# Patient Record
Sex: FEMALE | Race: WHITE | NOT HISPANIC OR LATINO
[De-identification: names, ages, dates, MRNs, and addresses within clinical notes are randomized per-mention and may not be internally consistent; named-entity substitution may affect disease eponyms.]

---

## 2017-01-18 ENCOUNTER — RX ONLY (OUTPATIENT)
Age: 57
Setting detail: RX ONLY
End: 2017-01-18

## 2017-01-18 ENCOUNTER — APPOINTMENT (RX ONLY)
Dept: URBAN - METROPOLITAN AREA CLINIC 44 | Facility: CLINIC | Age: 57
Setting detail: DERMATOLOGY
End: 2017-01-18

## 2017-01-18 ENCOUNTER — APPOINTMENT (RX ONLY)
Dept: URBAN - METROPOLITAN AREA CLINIC 45 | Facility: CLINIC | Age: 57
Setting detail: DERMATOLOGY
End: 2017-01-18

## 2017-01-18 DIAGNOSIS — Z41.9 ENCOUNTER FOR PROCEDURE FOR PURPOSES OTHER THAN REMEDYING HEALTH STATE, UNSPECIFIED: ICD-10-CM

## 2017-01-18 DIAGNOSIS — L73.8 OTHER SPECIFIED FOLLICULAR DISORDERS: ICD-10-CM

## 2017-01-18 DIAGNOSIS — L71.8 OTHER ROSACEA: ICD-10-CM

## 2017-01-18 PROCEDURE — ? TREATMENT REGIMEN

## 2017-01-18 PROCEDURE — 99213 OFFICE O/P EST LOW 20 MIN: CPT

## 2017-01-18 PROCEDURE — ? DYSPORT

## 2017-01-18 PROCEDURE — ? PRESCRIPTION

## 2017-01-18 PROCEDURE — ? COUNSELING

## 2017-01-18 RX ORDER — AZELAIC ACID 0.15 G/G
AEROSOL, FOAM TOPICAL
Qty: 1 | Refills: 3 | Status: ERX

## 2017-01-18 ASSESSMENT — LOCATION SIMPLE DESCRIPTION DERM
LOCATION SIMPLE: LEFT CHEEK
LOCATION SIMPLE: GLABELLA
LOCATION SIMPLE: GLABELLA
LOCATION SIMPLE: LEFT CHEEK
LOCATION SIMPLE: RIGHT CHEEK
LOCATION SIMPLE: RIGHT CHEEK

## 2017-01-18 ASSESSMENT — LOCATION DETAILED DESCRIPTION DERM
LOCATION DETAILED: LEFT INFERIOR MEDIAL MALAR CHEEK
LOCATION DETAILED: GLABELLA
LOCATION DETAILED: RIGHT INFERIOR MEDIAL MALAR CHEEK
LOCATION DETAILED: LEFT INFERIOR MEDIAL MALAR CHEEK
LOCATION DETAILED: GLABELLA
LOCATION DETAILED: RIGHT INFERIOR MEDIAL MALAR CHEEK

## 2017-01-18 ASSESSMENT — LOCATION ZONE DERM
LOCATION ZONE: FACE
LOCATION ZONE: FACE

## 2017-01-18 NOTE — PROCEDURE: TREATMENT REGIMEN
Initiate Treatment: Patient was advised to begin using the Finacea once daily in the morning and Tretinoin Cream once daily at night.  Patient was advised to follow up in 2-3 months for evaluation of treatment.
Detail Level: Zone

## 2017-01-18 NOTE — HPI: RASH (ROSACEA)
How Severe Is Your Rosacea?: moderate
Is This A New Presentation, Or A Follow-Up?: Follow Up Rosacea

## 2017-01-18 NOTE — PROCEDURE: MIPS QUALITY
Quality 110: Preventive Care And Screening: Influenza Immunization: Influenza immunization was not ordered or administered, reason not given
Quality 111:Pneumonia Vaccination Status For Older Adults: Pneumococcal Vaccination not Administered or Previously Received, Reason not Otherwise Specified
Quality 226: Preventive Care And Screening: Tobacco Use: Screening And Cessation Intervention: Patient screened for tobacco and never smoked
Detail Level: Detailed

## 2017-01-18 NOTE — PROCEDURE: MIPS QUALITY
Quality 226: Preventive Care And Screening: Tobacco Use: Screening And Cessation Intervention: Patient screened for tobacco and never smoked
Detail Level: Detailed
Quality 110: Preventive Care And Screening: Influenza Immunization: Influenza immunization was not ordered or administered, reason not given
Quality 111:Pneumonia Vaccination Status For Older Adults: Pneumococcal Vaccination not Administered or Previously Received, Reason not Otherwise Specified

## 2017-01-18 NOTE — PROCEDURE: DYSPORT
Lateral Platysmal Bands Units: 0
Post-Care Instructions: Patient instructed to not lie down for 4 hours and limit physical activity for 24 hours. Patient instructed not to travel by airplane for 48 hours.
Consent: Written consent obtained. Risks include but not limited to lid/brow ptosis, bruising, swelling, diplopia, temporary effect, incomplete chemical denervation.
Glabellar Complex Units: 60
Dilution (U/0.1 Cc): 30
Expiration Date (Month Year): 06/30/2017
Detail Level: Detailed
Lot #: [id number]
Price (Use Numbers Only, No Special Characters Or $): 210

## 2017-01-18 NOTE — PROCEDURE: TREATMENT REGIMEN
Detail Level: Zone
Initiate Treatment: Patient was advised to begin using the Finacea once daily in the morning and Tretinoin Cream once daily at night.  Patient was advised to follow up in 2-3 months for evaluation of treatment.

## 2017-01-18 NOTE — PROCEDURE: DYSPORT
Consent: Written consent obtained. Risks include but not limited to lid/brow ptosis, bruising, swelling, diplopia, temporary effect, incomplete chemical denervation.
Glabellar Complex Units: 60
Masseter Units: 0
Expiration Date (Month Year): 06/30/2017
Post-Care Instructions: Patient instructed to not lie down for 4 hours and limit physical activity for 24 hours. Patient instructed not to travel by airplane for 48 hours.
Detail Level: Detailed
Lot #: [id number]
Dilution (U/0.1 Cc): 30
Price (Use Numbers Only, No Special Characters Or $): 210

## 2017-04-21 ENCOUNTER — APPOINTMENT (RX ONLY)
Dept: URBAN - METROPOLITAN AREA CLINIC 37 | Facility: CLINIC | Age: 57
Setting detail: DERMATOLOGY
End: 2017-04-21

## 2017-04-21 ENCOUNTER — RX ONLY (OUTPATIENT)
Age: 57
Setting detail: RX ONLY
End: 2017-04-21

## 2017-04-21 ENCOUNTER — APPOINTMENT (RX ONLY)
Dept: URBAN - METROPOLITAN AREA CLINIC 38 | Facility: CLINIC | Age: 57
Setting detail: DERMATOLOGY
End: 2017-04-21

## 2017-04-21 DIAGNOSIS — L73.8 OTHER SPECIFIED FOLLICULAR DISORDERS: ICD-10-CM

## 2017-04-21 DIAGNOSIS — L70.5 ACNÉ EXCORIÉE: ICD-10-CM

## 2017-04-21 DIAGNOSIS — L72.0 EPIDERMAL CYST: ICD-10-CM

## 2017-04-21 DIAGNOSIS — L71.8 OTHER ROSACEA: ICD-10-CM

## 2017-04-21 PROCEDURE — ? PRESCRIPTION

## 2017-04-21 PROCEDURE — 99213 OFFICE O/P EST LOW 20 MIN: CPT

## 2017-04-21 PROCEDURE — ? COUNSELING

## 2017-04-21 RX ORDER — TRETINOIN 0.1 MG/G
GEL TOPICAL
Qty: 1 | Refills: 3 | Status: ERX

## 2017-04-21 ASSESSMENT — LOCATION DETAILED DESCRIPTION DERM
LOCATION DETAILED: RIGHT SUPERIOR LATERAL MALAR CHEEK
LOCATION DETAILED: RIGHT MEDIAL MALAR CHEEK
LOCATION DETAILED: LEFT INFERIOR CENTRAL MALAR CHEEK
LOCATION DETAILED: RIGHT SUPERIOR LATERAL MALAR CHEEK
LOCATION DETAILED: RIGHT SUPERIOR CENTRAL MALAR CHEEK
LOCATION DETAILED: LEFT INFERIOR CENTRAL MALAR CHEEK
LOCATION DETAILED: RIGHT INFERIOR CENTRAL MALAR CHEEK
LOCATION DETAILED: RIGHT MEDIAL MALAR CHEEK
LOCATION DETAILED: RIGHT CENTRAL ZYGOMA
LOCATION DETAILED: RIGHT SUPERIOR CENTRAL MALAR CHEEK
LOCATION DETAILED: RIGHT INFERIOR CENTRAL MALAR CHEEK
LOCATION DETAILED: RIGHT CENTRAL ZYGOMA

## 2017-04-21 ASSESSMENT — LOCATION SIMPLE DESCRIPTION DERM
LOCATION SIMPLE: RIGHT CHEEK
LOCATION SIMPLE: RIGHT CHEEK
LOCATION SIMPLE: LEFT CHEEK
LOCATION SIMPLE: LEFT CHEEK
LOCATION SIMPLE: RIGHT ZYGOMA
LOCATION SIMPLE: RIGHT ZYGOMA

## 2017-04-21 ASSESSMENT — LOCATION ZONE DERM
LOCATION ZONE: FACE
LOCATION ZONE: FACE

## 2017-04-21 NOTE — PROCEDURE: MIPS QUALITY
Quality 226: Preventive Care And Screening: Tobacco Use: Screening And Cessation Intervention: Patient screened for tobacco and never smoked
Quality 431: Preventive Care And Screening: Unhealthy Alcohol Use - Screening: Patient screened for unhealthy alcohol use using a single question and scores less than 2 times per year
Quality 110: Preventive Care And Screening: Influenza Immunization: Influenza immunization was not ordered or administered, reason not given
Quality 130: Documentation Of Current Medications In The Medical Record: Current Medications Documented
Detail Level: Detailed
Quality 111:Pneumonia Vaccination Status For Older Adults: Pneumococcal Vaccination not Administered or Previously Received, Reason not Otherwise Specified

## 2017-04-21 NOTE — PROCEDURE: MIPS QUALITY
Quality 431: Preventive Care And Screening: Unhealthy Alcohol Use - Screening: Patient screened for unhealthy alcohol use using a single question and scores less than 2 times per year
Quality 111:Pneumonia Vaccination Status For Older Adults: Pneumococcal Vaccination not Administered or Previously Received, Reason not Otherwise Specified
Quality 226: Preventive Care And Screening: Tobacco Use: Screening And Cessation Intervention: Patient screened for tobacco and never smoked
Quality 130: Documentation Of Current Medications In The Medical Record: Current Medications Documented
Detail Level: Detailed
Quality 110: Preventive Care And Screening: Influenza Immunization: Influenza immunization was not ordered or administered, reason not given

## 2017-04-26 RX ORDER — TRETINOIN 0.1 MG/G
GEL TOPICAL
Qty: 1 | Refills: 3 | Status: ERX

## 2017-08-23 ENCOUNTER — APPOINTMENT (RX ONLY)
Dept: URBAN - METROPOLITAN AREA CLINIC 45 | Facility: CLINIC | Age: 57
Setting detail: DERMATOLOGY
End: 2017-08-23

## 2017-08-23 ENCOUNTER — APPOINTMENT (RX ONLY)
Dept: URBAN - METROPOLITAN AREA CLINIC 44 | Facility: CLINIC | Age: 57
Setting detail: DERMATOLOGY
End: 2017-08-23

## 2017-08-23 DIAGNOSIS — Z41.9 ENCOUNTER FOR PROCEDURE FOR PURPOSES OTHER THAN REMEDYING HEALTH STATE, UNSPECIFIED: ICD-10-CM

## 2017-08-23 PROCEDURE — ? DYSPORT

## 2017-08-23 ASSESSMENT — LOCATION ZONE DERM
LOCATION ZONE: FACE
LOCATION ZONE: FACE

## 2017-08-23 ASSESSMENT — LOCATION SIMPLE DESCRIPTION DERM
LOCATION SIMPLE: GLABELLA
LOCATION SIMPLE: GLABELLA

## 2017-08-23 ASSESSMENT — LOCATION DETAILED DESCRIPTION DERM
LOCATION DETAILED: GLABELLA
LOCATION DETAILED: GLABELLA

## 2017-08-23 NOTE — PROCEDURE: DYSPORT
Additional Area 6 Units: 0
Glabellar Complex Units: 60
Detail Level: Detailed
Expiration Date (Month Year): 11/30/2017
Dilution (U/0.1 Cc): 30
Lot #: [id number]
Price (Use Numbers Only, No Special Characters Or $): 210
Post-Care Instructions: Patient instructed to not lie down for 4 hours and limit physical activity for 24 hours. Patient instructed not to travel by airplane for 48 hours.
Consent: Written consent obtained. Risks include but not limited to lid/brow ptosis, bruising, swelling, diplopia, temporary effect, incomplete chemical denervation.

## 2017-08-23 NOTE — PROCEDURE: DYSPORT
Price (Use Numbers Only, No Special Characters Or $): 210
Nasal Root Units: 0
Dilution (U/0.1 Cc): 30
Expiration Date (Month Year): 11/30/2017
Consent: Written consent obtained. Risks include but not limited to lid/brow ptosis, bruising, swelling, diplopia, temporary effect, incomplete chemical denervation.
Glabellar Complex Units: 60
Post-Care Instructions: Patient instructed to not lie down for 4 hours and limit physical activity for 24 hours. Patient instructed not to travel by airplane for 48 hours.
Lot #: [id number]
Detail Level: Detailed

## 2017-12-13 ENCOUNTER — APPOINTMENT (RX ONLY)
Dept: URBAN - METROPOLITAN AREA CLINIC 44 | Facility: CLINIC | Age: 57
Setting detail: DERMATOLOGY
End: 2017-12-13

## 2017-12-13 ENCOUNTER — APPOINTMENT (RX ONLY)
Dept: URBAN - METROPOLITAN AREA CLINIC 45 | Facility: CLINIC | Age: 57
Setting detail: DERMATOLOGY
End: 2017-12-13

## 2017-12-13 DIAGNOSIS — Z41.9 ENCOUNTER FOR PROCEDURE FOR PURPOSES OTHER THAN REMEDYING HEALTH STATE, UNSPECIFIED: ICD-10-CM

## 2017-12-13 PROCEDURE — ? DYSPORT

## 2017-12-13 NOTE — PROCEDURE: DYSPORT
Additional Area 4 Units: 0
Forehead Units: 60
Lot #: [id number]
Price (Use Numbers Only, No Special Characters Or $): 210
Consent: Written consent obtained. Risks include but not limited to lid/brow ptosis, bruising, swelling, diplopia, temporary effect, incomplete chemical denervation.
Detail Level: Detailed
Dilution (U/0.1 Cc): 30
Post-Care Instructions: Patient instructed to use the muscles injected for the next hour. Do not massage the area or lie down for 4 hours and no vigorous physical activity for 24 hours.
Expiration Date (Month Year): 04/2018

## 2017-12-13 NOTE — PROCEDURE: DYSPORT
Additional Area 6 Units: 0
Forehead Units: 60
Dilution (U/0.1 Cc): 30
Post-Care Instructions: Patient instructed to use the muscles injected for the next hour. Do not massage the area or lie down for 4 hours and no vigorous physical activity for 24 hours.
Lot #: [id number]
Expiration Date (Month Year): 04/2018
Consent: Written consent obtained. Risks include but not limited to lid/brow ptosis, bruising, swelling, diplopia, temporary effect, incomplete chemical denervation.
Detail Level: Detailed
Price (Use Numbers Only, No Special Characters Or $): 210

## 2018-03-07 ENCOUNTER — APPOINTMENT (RX ONLY)
Dept: URBAN - METROPOLITAN AREA CLINIC 44 | Facility: CLINIC | Age: 58
Setting detail: DERMATOLOGY
End: 2018-03-07

## 2018-03-07 ENCOUNTER — APPOINTMENT (RX ONLY)
Dept: URBAN - METROPOLITAN AREA CLINIC 45 | Facility: CLINIC | Age: 58
Setting detail: DERMATOLOGY
End: 2018-03-07

## 2018-03-07 DIAGNOSIS — Z41.9 ENCOUNTER FOR PROCEDURE FOR PURPOSES OTHER THAN REMEDYING HEALTH STATE, UNSPECIFIED: ICD-10-CM

## 2018-03-07 PROBLEM — L85.3 XEROSIS CUTIS: Status: ACTIVE | Noted: 2018-03-07

## 2018-03-07 PROCEDURE — ? DYSPORT

## 2018-03-07 NOTE — PROCEDURE: DYSPORT
Additional Area 1 Units: 0
Lot #: [id number]
Price (Use Numbers Only, No Special Characters Or $): 252.00
Consent: Written consent obtained. Risks include but not limited to lid/brow ptosis, bruising, swelling, diplopia, temporary effect, incomplete chemical denervation.
Dilution (U/0.1 Cc): 30
Additional Comments: total of 72 units used
Expiration Date (Month Year): 6/30/18
Detail Level: Detailed
Post-Care Instructions: Patient instructed to use the muscles injected for the next hour. Do not massage the area or lie down for 4 hours and no vigorous physical activity for 24 hours.

## 2018-03-07 NOTE — PROCEDURE: DYSPORT
Additional Area 3 Units: 0
Dilution (U/0.1 Cc): 30
Price (Use Numbers Only, No Special Characters Or $): 252.00
Additional Comments: total of 72 units used
Lot #: [id number]
Post-Care Instructions: Patient instructed to use the muscles injected for the next hour. Do not massage the area or lie down for 4 hours and no vigorous physical activity for 24 hours.
Consent: Written consent obtained. Risks include but not limited to lid/brow ptosis, bruising, swelling, diplopia, temporary effect, incomplete chemical denervation.
Detail Level: Detailed
Expiration Date (Month Year): 6/30/18

## 2018-07-03 ENCOUNTER — APPOINTMENT (RX ONLY)
Dept: URBAN - METROPOLITAN AREA CLINIC 44 | Facility: CLINIC | Age: 58
Setting detail: DERMATOLOGY
End: 2018-07-03

## 2018-07-03 ENCOUNTER — APPOINTMENT (RX ONLY)
Dept: URBAN - METROPOLITAN AREA CLINIC 45 | Facility: CLINIC | Age: 58
Setting detail: DERMATOLOGY
End: 2018-07-03

## 2018-07-03 DIAGNOSIS — Z41.9 ENCOUNTER FOR PROCEDURE FOR PURPOSES OTHER THAN REMEDYING HEALTH STATE, UNSPECIFIED: ICD-10-CM

## 2018-07-03 PROCEDURE — ? DYSPORT

## 2018-07-03 ASSESSMENT — LOCATION ZONE DERM
LOCATION ZONE: FACE
LOCATION ZONE: FACE

## 2018-07-03 ASSESSMENT — LOCATION SIMPLE DESCRIPTION DERM
LOCATION SIMPLE: RIGHT FOREHEAD
LOCATION SIMPLE: SUPERIOR FOREHEAD
LOCATION SIMPLE: RIGHT FOREHEAD
LOCATION SIMPLE: GLABELLA
LOCATION SIMPLE: GLABELLA
LOCATION SIMPLE: SUPERIOR FOREHEAD
LOCATION SIMPLE: LEFT FOREHEAD
LOCATION SIMPLE: LEFT FOREHEAD

## 2018-07-03 ASSESSMENT — LOCATION DETAILED DESCRIPTION DERM
LOCATION DETAILED: RIGHT SUPERIOR FOREHEAD
LOCATION DETAILED: SUPERIOR MID FOREHEAD
LOCATION DETAILED: LEFT INFERIOR MEDIAL FOREHEAD
LOCATION DETAILED: LEFT SUPERIOR FOREHEAD
LOCATION DETAILED: RIGHT INFERIOR MEDIAL FOREHEAD
LOCATION DETAILED: LEFT SUPERIOR FOREHEAD
LOCATION DETAILED: SUPERIOR MID FOREHEAD
LOCATION DETAILED: LEFT INFERIOR MEDIAL FOREHEAD
LOCATION DETAILED: RIGHT INFERIOR MEDIAL FOREHEAD
LOCATION DETAILED: GLABELLA
LOCATION DETAILED: RIGHT SUPERIOR FOREHEAD
LOCATION DETAILED: GLABELLA

## 2018-07-03 NOTE — PROCEDURE: DYSPORT
Price (Use Numbers Only, No Special Characters Or $): 210
Additional Area 5 Units: 0
Additional Area 1 Location: Forehead and Gabellar
Expiration Date (Month Year): 11/30/2018
Detail Level: Detailed
Additional Area 1 Units: 60
Consent: Written consent obtained. Risks include but not limited to lid/brow ptosis, bruising, swelling, diplopia, temporary effect, incomplete chemical denervation.
Lot #: [id number]
Post-Care Instructions: Patient instructed to use the muscles injected for the next hour. Do not massage the area or lie down for 4 hours and no vigorous physical activity for 24 hours.
Dilution (U/0.1 Cc): 30

## 2018-07-03 NOTE — PROCEDURE: DYSPORT
Glabellar Complex Units: 0
Post-Care Instructions: Patient instructed to use the muscles injected for the next hour. Do not massage the area or lie down for 4 hours and no vigorous physical activity for 24 hours.
Price (Use Numbers Only, No Special Characters Or $): 210
Additional Area 1 Location: Forehead and Gabellar
Expiration Date (Month Year): 11/30/2018
Additional Area 1 Units: 60
Dilution (U/0.1 Cc): 30
Consent: Written consent obtained. Risks include but not limited to lid/brow ptosis, bruising, swelling, diplopia, temporary effect, incomplete chemical denervation.
Lot #: [id number]
Detail Level: Detailed

## 2018-10-23 ENCOUNTER — APPOINTMENT (RX ONLY)
Dept: URBAN - METROPOLITAN AREA CLINIC 44 | Facility: CLINIC | Age: 58
Setting detail: DERMATOLOGY
End: 2018-10-23

## 2018-10-23 ENCOUNTER — APPOINTMENT (RX ONLY)
Dept: URBAN - METROPOLITAN AREA CLINIC 45 | Facility: CLINIC | Age: 58
Setting detail: DERMATOLOGY
End: 2018-10-23

## 2018-10-23 DIAGNOSIS — Z41.9 ENCOUNTER FOR PROCEDURE FOR PURPOSES OTHER THAN REMEDYING HEALTH STATE, UNSPECIFIED: ICD-10-CM

## 2018-10-23 PROCEDURE — ? DYSPORT (U OR CC)

## 2018-10-23 NOTE — PROCEDURE: DYSPORT (U OR CC)
Lot #: [id number]
Forehead Units: 30
Additional Area 5 Units: 0
Detail Level: Detailed
Glabellar Complex Units: 45
Expiration Date (Month Year): 04/30/2019
Periorbital Skin Units: 36
Consent: Written consent obtained. Risks include but not limited to lid/brow ptosis, bruising, swelling, diplopia, temporary effect, incomplete chemical denervation.
Price (Use Numbers Only, No Special Characters Or $): 464.15
Post-Care Instructions: Patient instructed to use the muscles injected for the next hour.  Do not massage the area or lie down for 4 hours and no vigorous physical activity for 24 hours.

## 2018-10-23 NOTE — PROCEDURE: DYSPORT (U OR CC)
Detail Level: Detailed
Additional Area 2 Units: 0
Dilution (U/0.1 Cc): 30
Glabellar Complex Units: 45
Periorbital Skin Units: 36
Consent: Written consent obtained. Risks include but not limited to lid/brow ptosis, bruising, swelling, diplopia, temporary effect, incomplete chemical denervation.
Price (Use Numbers Only, No Special Characters Or $): 594.32
Post-Care Instructions: Patient instructed to use the muscles injected for the next hour.  Do not massage the area or lie down for 4 hours and no vigorous physical activity for 24 hours.
Expiration Date (Month Year): 04/30/2019
Lot #: [id number]

## 2019-02-26 ENCOUNTER — APPOINTMENT (RX ONLY)
Dept: URBAN - METROPOLITAN AREA CLINIC 44 | Facility: CLINIC | Age: 59
Setting detail: DERMATOLOGY
End: 2019-02-26

## 2019-02-26 ENCOUNTER — APPOINTMENT (RX ONLY)
Dept: URBAN - METROPOLITAN AREA CLINIC 45 | Facility: CLINIC | Age: 59
Setting detail: DERMATOLOGY
End: 2019-02-26

## 2019-02-26 DIAGNOSIS — L70.0 ACNE VULGARIS: ICD-10-CM

## 2019-02-26 DIAGNOSIS — Z41.9 ENCOUNTER FOR PROCEDURE FOR PURPOSES OTHER THAN REMEDYING HEALTH STATE, UNSPECIFIED: ICD-10-CM

## 2019-02-26 PROCEDURE — ? DYSPORT (U OR CC)

## 2019-02-26 PROCEDURE — ? MEDICATION COUNSELING

## 2019-02-26 PROCEDURE — ? COUNSELING

## 2019-02-26 PROCEDURE — 99212 OFFICE O/P EST SF 10 MIN: CPT

## 2019-02-26 PROCEDURE — ? PRESCRIPTION

## 2019-02-26 PROCEDURE — ? TREATMENT REGIMEN

## 2019-02-26 RX ORDER — DAPSONE 50 MG/G
PEA SIZE GEL TOPICAL ONCE A DAY
Qty: 1 | Refills: 6 | Status: ERX

## 2019-02-26 ASSESSMENT — LOCATION DETAILED DESCRIPTION DERM
LOCATION DETAILED: GLABELLA
LOCATION DETAILED: RIGHT INFERIOR CENTRAL MALAR CHEEK
LOCATION DETAILED: RIGHT MID TEMPLE
LOCATION DETAILED: LEFT LATERAL CANTHUS
LOCATION DETAILED: RIGHT MEDIAL EYEBROW
LOCATION DETAILED: RIGHT INFERIOR CENTRAL MALAR CHEEK
LOCATION DETAILED: RIGHT MEDIAL EYEBROW
LOCATION DETAILED: RIGHT MID TEMPLE
LOCATION DETAILED: GLABELLA
LOCATION DETAILED: LEFT LATERAL CANTHUS

## 2019-02-26 ASSESSMENT — LOCATION SIMPLE DESCRIPTION DERM
LOCATION SIMPLE: RIGHT EYEBROW
LOCATION SIMPLE: LEFT EYELID
LOCATION SIMPLE: RIGHT EYEBROW
LOCATION SIMPLE: RIGHT TEMPLE
LOCATION SIMPLE: RIGHT TEMPLE
LOCATION SIMPLE: GLABELLA
LOCATION SIMPLE: GLABELLA
LOCATION SIMPLE: RIGHT CHEEK
LOCATION SIMPLE: RIGHT CHEEK
LOCATION SIMPLE: LEFT EYELID

## 2019-02-26 ASSESSMENT — LOCATION ZONE DERM
LOCATION ZONE: FACE
LOCATION ZONE: EYELID
LOCATION ZONE: FACE
LOCATION ZONE: EYELID

## 2019-02-26 NOTE — PROCEDURE: DYSPORT (U OR CC)
Quantity Per Injection Site (Units Or Cc): 15 U
Inferior Lateral Orbicularis Oculi Units: 0
Consent: Written consent obtained. Risks include but not limited to lid/brow ptosis, bruising, swelling, diplopia, temporary effect, incomplete chemical denervation.
Price (Use Numbers Only, No Special Characters Or $): 262.72
Periorbital Skin Units: 30
Detail Level: Detailed
Glabellar Complex Units: 45
Post-Care Instructions: Patient instructed to use the muscles injected for the next hour.  Do not massage the area or lie down for 4 hours and no vigorous physical activity for 24 hours.

## 2019-02-26 NOTE — PROCEDURE: COUNSELING
Topical Sulfur Applications Pregnancy And Lactation Text: This medication is Pregnancy Category C and has an unknown safety profile during pregnancy. It is unknown if this topical medication is excreted in breast milk.
Topical Retinoid counseling:  Patient advised to apply a pea-sized amount only at bedtime and wait 30 minutes after washing their face before applying.  If too drying, patient may add a non-comedogenic moisturizer. The patient verbalized understanding of the proper use and possible adverse effects of retinoids.  All of the patient's questions and concerns were addressed.
Spironolactone Counseling: Patient advised regarding risks of diarrhea, abdominal pain, hyperkalemia, birth defects (for female patients), liver toxicity and renal toxicity. The patient may need blood work to monitor liver and kidney function and potassium levels while on therapy. The patient verbalized understanding of the proper use and possible adverse effects of spironolactone.  All of the patient's questions and concerns were addressed.
Azithromycin Pregnancy And Lactation Text: This medication is considered safe during pregnancy and is also secreted in breast milk.
High Dose Vitamin A Counseling: Side effects reviewed, pt to contact office should one occur.
Include Pregnancy/Lactation Warning?: No
Topical Retinoid Pregnancy And Lactation Text: This medication is Pregnancy Category C. It is unknown if this medication is excreted in breast milk.
Isotretinoin Counseling: Patient should get monthly blood tests, not donate blood, not drive at night if vision affected, not share medication, and not undergo elective surgery for 6 months after tx completed. Side effects reviewed, pt to contact office should one occur.
Birth Control Pills Pregnancy And Lactation Text: This medication should be avoided if pregnant and for the first 30 days post-partum.
High Dose Vitamin A Pregnancy And Lactation Text: High dose vitamin A therapy is contraindicated during pregnancy and breast feeding.
Doxycycline Pregnancy And Lactation Text: This medication is Pregnancy Category D and not consider safe during pregnancy. It is also excreted in breast milk but is considered safe for shorter treatment courses.
Topical Clindamycin Counseling: Patient counseled that this medication may cause skin irritation or allergic reactions.  In the event of skin irritation, the patient was advised to reduce the amount of the drug applied or use it less frequently.   The patient verbalized understanding of the proper use and possible adverse effects of clindamycin.  All of the patient's questions and concerns were addressed.
Erythromycin Pregnancy And Lactation Text: This medication is Pregnancy Category B and is considered safe during pregnancy. It is also excreted in breast milk.
Tetracycline Counseling: Patient counseled regarding possible photosensitivity and increased risk for sunburn.  Patient instructed to avoid sunlight, if possible.  When exposed to sunlight, patients should wear protective clothing, sunglasses, and sunscreen.  The patient was instructed to call the office immediately if the following severe adverse effects occur:  hearing changes, easy bruising/bleeding, severe headache, or vision changes.  The patient verbalized understanding of the proper use and possible adverse effects of tetracycline.  All of the patient's questions and concerns were addressed. Patient understands to avoid pregnancy while on therapy due to potential birth defects.
Tazorac Pregnancy And Lactation Text: This medication is not safe during pregnancy. It is unknown if this medication is excreted in breast milk.
Spironolactone Pregnancy And Lactation Text: This medication can cause feminization of the male fetus and should be avoided during pregnancy. The active metabolite is also found in breast milk.
Bactrim Pregnancy And Lactation Text: This medication is Pregnancy Category D and is known to cause fetal risk.  It is also excreted in breast milk.
Isotretinoin Pregnancy And Lactation Text: This medication is Pregnancy Category X and is considered extremely dangerous during pregnancy. It is unknown if it is excreted in breast milk.
Tetracycline Pregnancy And Lactation Text: This medication is Pregnancy Category D and not consider safe during pregnancy. It is also excreted in breast milk.
Erythromycin Counseling:  I discussed with the patient the risks of erythromycin including but not limited to GI upset, allergic reaction, drug rash, diarrhea, increase in liver enzymes, and yeast infections.
Benzoyl Peroxide Pregnancy And Lactation Text: This medication is Pregnancy Category C. It is unknown if benzoyl peroxide is excreted in breast milk.
Dapsone Counseling: I discussed with the patient the risks of dapsone including but not limited to hemolytic anemia, agranulocytosis, rashes, methemoglobinemia, kidney failure, peripheral neuropathy, headaches, GI upset, and liver toxicity.  Patients who start dapsone require monitoring including baseline LFTs and weekly CBCs for the first month, then every month thereafter.  The patient verbalized understanding of the proper use and possible adverse effects of dapsone.  All of the patient's questions and concerns were addressed.
Benzoyl Peroxide Counseling: Patient counseled that medicine may cause skin irritation and bleach clothing.  In the event of skin irritation, the patient was advised to reduce the amount of the drug applied or use it less frequently.   The patient verbalized understanding of the proper use and possible adverse effects of benzoyl peroxide.  All of the patient's questions and concerns were addressed.
Doxycycline Counseling:  Patient counseled regarding possible photosensitivity and increased risk for sunburn.  Patient instructed to avoid sunlight, if possible.  When exposed to sunlight, patients should wear protective clothing, sunglasses, and sunscreen.  The patient was instructed to call the office immediately if the following severe adverse effects occur:  hearing changes, easy bruising/bleeding, severe headache, or vision changes.  The patient verbalized understanding of the proper use and possible adverse effects of doxycycline.  All of the patient's questions and concerns were addressed.
Minocycline Counseling: Patient advised regarding possible photosensitivity and discoloration of the teeth, skin, lips, tongue and gums.  Patient instructed to avoid sunlight, if possible.  When exposed to sunlight, patients should wear protective clothing, sunglasses, and sunscreen.  The patient was instructed to call the office immediately if the following severe adverse effects occur:  hearing changes, easy bruising/bleeding, severe headache, or vision changes.  The patient verbalized understanding of the proper use and possible adverse effects of minocycline.  All of the patient's questions and concerns were addressed.
Azithromycin Counseling:  I discussed with the patient the risks of azithromycin including but not limited to GI upset, allergic reaction, drug rash, diarrhea, and yeast infections.
Topical Clindamycin Pregnancy And Lactation Text: This medication is Pregnancy Category B and is considered safe during pregnancy. It is unknown if it is excreted in breast milk.
Birth Control Pills Counseling: Birth Control Pill Counseling: I discussed with the patient the potential side effects of OCPs including but not limited to increased risk of stroke, heart attack, thrombophlebitis, deep venous thrombosis, hepatic adenomas, breast changes, GI upset, headaches, and depression.  The patient verbalized understanding of the proper use and possible adverse effects of OCPs. All of the patient's questions and concerns were addressed.
Detail Level: Zone
Tazorac Counseling:  Patient advised that medication is irritating and drying.  Patient may need to apply sparingly and wash off after an hour before eventually leaving it on overnight.  The patient verbalized understanding of the proper use and possible adverse effects of tazorac.  All of the patient's questions and concerns were addressed.
Bactrim Counseling:  I discussed with the patient the risks of sulfa antibiotics including but not limited to GI upset, allergic reaction, drug rash, diarrhea, dizziness, photosensitivity, and yeast infections.  Rarely, more serious reactions can occur including but not limited to aplastic anemia, agranulocytosis, methemoglobinemia, blood dyscrasias, liver or kidney failure, lung infiltrates or desquamative/blistering drug rashes.
Topical Sulfur Applications Counseling: Topical Sulfur Counseling: Patient counseled that this medication may cause skin irritation or allergic reactions.  In the event of skin irritation, the patient was advised to reduce the amount of the drug applied or use it less frequently.   The patient verbalized understanding of the proper use and possible adverse effects of topical sulfur application.  All of the patient's questions and concerns were addressed.
Dapsone Pregnancy And Lactation Text: This medication is Pregnancy Category C and is not considered safe during pregnancy or breast feeding.

## 2019-02-26 NOTE — PROCEDURE: MEDICATION COUNSELING
Picato Pregnancy And Lactation Text: This medication is Pregnancy Category C. It is unknown if this medication is excreted in breast milk.
Methotrexate Counseling:  Patient counseled regarding adverse effects of methotrexate including but not limited to nausea, vomiting, abnormalities in liver function tests. Patients may develop mouth sores, rash, diarrhea, and abnormalities in blood counts. The patient understands that monitoring is required including LFT's and blood counts.  There is a rare possibility of scarring of the liver and lung problems that can occur when taking methotrexate. Persistent nausea, loss of appetite, pale stools, dark urine, cough, and shortness of breath should be reported immediately. Patient advised to discontinue methotrexate treatment at least three months before attempting to become pregnant.  I discussed the need for folate supplements while taking methotrexate.  These supplements can decrease side effects during methotrexate treatment. The patient verbalized understanding of the proper use and possible adverse effects of methotrexate.  All of the patient's questions and concerns were addressed.
Sski Pregnancy And Lactation Text: This medication is Pregnancy Category D and isn't considered safe during pregnancy. It is excreted in breast milk.
Minocycline Counseling: Patient advised regarding possible photosensitivity and discoloration of the teeth, skin, lips, tongue and gums.  Patient instructed to avoid sunlight, if possible.  When exposed to sunlight, patients should wear protective clothing, sunglasses, and sunscreen.  The patient was instructed to call the office immediately if the following severe adverse effects occur:  hearing changes, easy bruising/bleeding, severe headache, or vision changes.  The patient verbalized understanding of the proper use and possible adverse effects of minocycline.  All of the patient's questions and concerns were addressed.
Protopic Counseling: Patient may experience a mild burning sensation during topical application. Protopic is not approved in children less than 2 years of age. There have been case reports of hematologic and skin malignancies in patients using topical calcineurin inhibitors although causality is questionable.
Ivermectin Pregnancy And Lactation Text: This medication is Pregnancy Category C and it isn't known if it is safe during pregnancy. It is also excreted in breast milk.
Bexarotene Pregnancy And Lactation Text: This medication is Pregnancy Category X and should not be given to women who are pregnant or may become pregnant. This medication should not be used if you are breast feeding.
Albendazole Counseling:  I discussed with the patient the risks of albendazole including but not limited to cytopenia, kidney damage, nausea/vomiting and severe allergy.  The patient understands that this medication is being used in an off-label manner.
Doxycycline Pregnancy And Lactation Text: This medication is Pregnancy Category D and not consider safe during pregnancy. It is also excreted in breast milk but is considered safe for shorter treatment courses.
Bexarotene Counseling:  I discussed with the patient the risks of bexarotene including but not limited to hair loss, dry lips/skin/eyes, liver abnormalities, hyperlipidemia, pancreatitis, depression/suicidal ideation, photosensitivity, drug rash/allergic reactions, hypothyroidism, anemia, leukopenia, infection, cataracts, and teratogenicity.  Patient understands that they will need regular blood tests to check lipid profile, liver function tests, white blood cell count, thyroid function tests and pregnancy test if applicable.
Ketoconazole Counseling:   Patient counseled regarding improving absorption with orange juice.  Adverse effects include but are not limited to breast enlargement, headache, diarrhea, nausea, upset stomach, liver function test abnormalities, taste disturbance, and stomach pain.  There is a rare possibility of liver failure that can occur when taking ketoconazole. The patient understands that monitoring of LFTs may be required, especially at baseline. The patient verbalized understanding of the proper use and possible adverse effects of ketoconazole.  All of the patient's questions and concerns were addressed.
Metronidazole Counseling:  I discussed with the patient the risks of metronidazole including but not limited to seizures, nausea/vomiting, a metallic taste in the mouth, nausea/vomiting and severe allergy.
Prednisone Counseling:  I discussed with the patient the risks of prolonged use of prednisone including but not limited to weight gain, insomnia, osteoporosis, mood changes, diabetes, susceptibility to infection, glaucoma and high blood pressure.  In cases where prednisone use is prolonged, patients should be monitored with blood pressure checks, serum glucose levels and an eye exam.  Additionally, the patient may need to be placed on GI prophylaxis, PCP prophylaxis, and calcium and vitamin D supplementation and/or a bisphosphonate.  The patient verbalized understanding of the proper use and the possible adverse effects of prednisone.  All of the patient's questions and concerns were addressed.
Griseofulvin Counseling:  I discussed with the patient the risks of griseofulvin including but not limited to photosensitivity, cytopenia, liver damage, nausea/vomiting and severe allergy.  The patient understands that this medication is best absorbed when taken with a fatty meal (e.g., ice cream or french fries).
Tetracycline Counseling: Patient counseled regarding possible photosensitivity and increased risk for sunburn.  Patient instructed to avoid sunlight, if possible.  When exposed to sunlight, patients should wear protective clothing, sunglasses, and sunscreen.  The patient was instructed to call the office immediately if the following severe adverse effects occur:  hearing changes, easy bruising/bleeding, severe headache, or vision changes.  The patient verbalized understanding of the proper use and possible adverse effects of tetracycline.  All of the patient's questions and concerns were addressed. Patient understands to avoid pregnancy while on therapy due to potential birth defects.
Stelara Pregnancy And Lactation Text: This medication is Pregnancy Category B and is considered safe during pregnancy. It is unknown if this medication is excreted in breast milk.
Solaraze Pregnancy And Lactation Text: This medication is Pregnancy Category B and is considered safe. There is some data to suggest avoiding during the third trimester. It is unknown if this medication is excreted in breast milk.
Taltz Pregnancy And Lactation Text: The risk during pregnancy and breastfeeding is uncertain with this medication.
Eucrisa Counseling: Patient may experience a mild burning sensation during topical application. Eucrisa is not approved in children less than 2 years of age.
High Dose Vitamin A Pregnancy And Lactation Text: High dose vitamin A therapy is contraindicated during pregnancy and breast feeding.
Cyclosporine Pregnancy And Lactation Text: This medication is Pregnancy Category C and it isn't know if it is safe during pregnancy. This medication is excreted in breast milk.
Xeleviez Pregnancy And Lactation Text: This medication is Pregnancy Category D and is not considered safe during pregnancy.  The risk during breast feeding is also uncertain.
Azithromycin Pregnancy And Lactation Text: This medication is considered safe during pregnancy and is also secreted in breast milk.
Fluconazole Counseling:  Patient counseled regarding adverse effects of fluconazole including but not limited to headache, diarrhea, nausea, upset stomach, liver function test abnormalities, taste disturbance, and stomach pain.  There is a rare possibility of liver failure that can occur when taking fluconazole.  The patient understands that monitoring of LFTs and kidney function test may be required, especially at baseline. The patient verbalized understanding of the proper use and possible adverse effects of fluconazole.  All of the patient's questions and concerns were addressed.
Cosentyx Counseling:  I discussed with the patient the risks of Cosentyx including but not limited to worsening of Crohn's disease, immunosuppression, allergic reactions and infections.  The patient understands that monitoring is required including a PPD at baseline and must alert us or the primary physician if symptoms of infection or other concerning signs are noted.
Bactrim Counseling:  I discussed with the patient the risks of sulfa antibiotics including but not limited to GI upset, allergic reaction, drug rash, diarrhea, dizziness, photosensitivity, and yeast infections.  Rarely, more serious reactions can occur including but not limited to aplastic anemia, agranulocytosis, methemoglobinemia, blood dyscrasias, liver or kidney failure, lung infiltrates or desquamative/blistering drug rashes.
Otezla Counseling: The side effects of Otezla were discussed with the patient, including but not limited to worsening or new depression, weight loss, diarrhea, nausea, upper respiratory tract infection, and headache. Patient instructed to call the office should any adverse effect occur.  The patient verbalized understanding of the proper use and possible adverse effects of Otezla.  All the patient's questions and concerns were addressed.
Xeljanz Counseling: I discussed with the patient the risks of Xeljanz therapy including increased risk of infection, liver issues, headache, diarrhea, or cold symptoms. Live vaccines should be avoided. They were instructed to call if they have any problems.
Minocycline Pregnancy And Lactation Text: This medication is Pregnancy Category D and not consider safe during pregnancy. It is also excreted in breast milk.
Benzoyl Peroxide Counseling: Patient counseled that medicine may cause skin irritation and bleach clothing.  In the event of skin irritation, the patient was advised to reduce the amount of the drug applied or use it less frequently.   The patient verbalized understanding of the proper use and possible adverse effects of benzoyl peroxide.  All of the patient's questions and concerns were addressed.
Griseofulvin Pregnancy And Lactation Text: This medication is Pregnancy Category X and is known to cause serious birth defects. It is unknown if this medication is excreted in breast milk but breast feeding should be avoided.
Topical Clindamycin Counseling: Patient counseled that this medication may cause skin irritation or allergic reactions.  In the event of skin irritation, the patient was advised to reduce the amount of the drug applied or use it less frequently.   The patient verbalized understanding of the proper use and possible adverse effects of clindamycin.  All of the patient's questions and concerns were addressed.
Carac Pregnancy And Lactation Text: This medication is Pregnancy Category X and contraindicated in pregnancy and in women who may become pregnant. It is unknown if this medication is excreted in breast milk.
Odomzo Pregnancy And Lactation Text: This medication is Pregnancy Category X and is absolutely contraindicated during pregnancy. It is unknown if it is excreted in breast milk.
Tazorac Pregnancy And Lactation Text: This medication is not safe during pregnancy. It is unknown if this medication is excreted in breast milk.
Topical Sulfur Applications Counseling: Topical Sulfur Counseling: Patient counseled that this medication may cause skin irritation or allergic reactions.  In the event of skin irritation, the patient was advised to reduce the amount of the drug applied or use it less frequently.   The patient verbalized understanding of the proper use and possible adverse effects of topical sulfur application.  All of the patient's questions and concerns were addressed.
Bactrim Pregnancy And Lactation Text: This medication is Pregnancy Category D and is known to cause fetal risk.  It is also excreted in breast milk.
Hydroxychloroquine Pregnancy And Lactation Text: This medication has been shown to cause fetal harm but it isn't assigned a Pregnancy Risk Category. There are small amounts excreted in breast milk.
Gabapentin Counseling: I discussed with the patient the risks of gabapentin including but not limited to dizziness, somnolence, fatigue and ataxia.
Cephalexin Counseling: I counseled the patient regarding use of cephalexin as an antibiotic for prophylactic and/or therapeutic purposes. Cephalexin (commonly prescribed under brand name Keflex) is a cephalosporin antibiotic which is active against numerous classes of bacteria, including most skin bacteria. Side effects may include nausea, diarrhea, gastrointestinal upset, rash, hives, yeast infections, and in rare cases, hepatitis, kidney disease, seizures, fever, confusion, neurologic symptoms, and others. Patients with severe allergies to penicillin medications are cautioned that there is about a 10% incidence of cross-reactivity with cephalosporins. When possible, patients with penicillin allergies should use alternatives to cephalosporins for antibiotic therapy.
Glycopyrrolate Counseling:  I discussed with the patient the risks of glycopyrrolate including but not limited to skin rash, drowsiness, dry mouth, difficulty urinating, and blurred vision.
Humira Counseling:  I discussed with the patient the risks of adalimumab including but not limited to myelosuppression, immunosuppression, autoimmune hepatitis, demyelinating diseases, lymphoma, and serious infections.  The patient understands that monitoring is required including a PPD at baseline and must alert us or the primary physician if symptoms of infection or other concerning signs are noted.
Topical Retinoid counseling:  Patient advised to apply a pea-sized amount only at bedtime and wait 30 minutes after washing their face before applying.  If too drying, patient may add a non-comedogenic moisturizer. The patient verbalized understanding of the proper use and possible adverse effects of retinoids.  All of the patient's questions and concerns were addressed.
Hydroquinone Pregnancy And Lactation Text: This medication has not been assigned a Pregnancy Risk Category but animal studies failed to show danger with the topical medication. It is unknown if the medication is excreted in breast milk.
Rituxan Counseling:  I discussed with the patient the risks of Rituxan infusions. Side effects can include infusion reactions, severe drug rashes including mucocutaneous reactions, reactivation of latent hepatitis and other infections and rarely progressive multifocal leukoencephalopathy.  All of the patient's questions and concerns were addressed.
High Dose Vitamin A Counseling: Side effects reviewed, pt to contact office should one occur.
Colchicine Pregnancy And Lactation Text: This medication is Pregnancy Category C and isn't considered safe during pregnancy. It is excreted in breast milk.
Picato Counseling:  I discussed with the patient the risks of Picato including but not limited to erythema, scaling, itching, weeping, crusting, and pain.
Erythromycin Pregnancy And Lactation Text: This medication is Pregnancy Category B and is considered safe during pregnancy. It is also excreted in breast milk.
Erythromycin Counseling:  I discussed with the patient the risks of erythromycin including but not limited to GI upset, allergic reaction, drug rash, diarrhea, increase in liver enzymes, and yeast infections.
Terbinafine Counseling: Patient counseling regarding adverse effects of terbinafine including but not limited to headache, diarrhea, rash, upset stomach, liver function test abnormalities, itching, taste/smell disturbance, nausea, abdominal pain, and flatulence.  There is a rare possibility of liver failure that can occur when taking terbinafine.  The patient understands that a baseline LFT and kidney function test may be required. The patient verbalized understanding of the proper use and possible adverse effects of terbinafine.  All of the patient's questions and concerns were addressed.
Metronidazole Pregnancy And Lactation Text: This medication is Pregnancy Category B and considered safe during pregnancy.  It is also excreted in breast milk.
Spironolactone Pregnancy And Lactation Text: This medication can cause feminization of the male fetus and should be avoided during pregnancy. The active metabolite is also found in breast milk.
Oxybutynin Pregnancy And Lactation Text: This medication is Pregnancy Category B and is considered safe during pregnancy. It is unknown if it is excreted in breast milk.
Xolair Counseling:  Patient informed of potential adverse effects including but not limited to fever, muscle aches, rash and allergic reactions.  The patient verbalized understanding of the proper use and possible adverse effects of Xolair.  All of the patient's questions and concerns were addressed.
Drysol Counseling:  I discussed with the patient the risks of drysol/aluminum chloride including but not limited to skin rash, itching, irritation, burning.
Cimzia Counseling:  I discussed with the patient the risks of Cimzia including but not limited to immunosuppression, allergic reactions and infections.  The patient understands that monitoring is required including a PPD at baseline and must alert us or the primary physician if symptoms of infection or other concerning signs are noted.
Detail Level: Simple
Clofazimine Counseling:  I discussed with the patient the risks of clofazimine including but not limited to skin and eye pigmentation, liver damage, nausea/vomiting, gastrointestinal bleeding and allergy.
Azathioprine Counseling:  I discussed with the patient the risks of azathioprine including but not limited to myelosuppression, immunosuppression, hepatotoxicity, lymphoma, and infections.  The patient understands that monitoring is required including baseline LFTs, Creatinine, possible TPMP genotyping and weekly CBCs for the first month and then every 2 weeks thereafter.  The patient verbalized understanding of the proper use and possible adverse effects of azathioprine.  All of the patient's questions and concerns were addressed.
Protopic Pregnancy And Lactation Text: This medication is Pregnancy Category C. It is unknown if this medication is excreted in breast milk when applied topically.
Doxepin Pregnancy And Lactation Text: This medication is Pregnancy Category C and it isn't known if it is safe during pregnancy. It is also excreted in breast milk and breast feeding isn't recommended.
Oxybutynin Counseling:  I discussed with the patient the risks of oxybutynin including but not limited to skin rash, drowsiness, dry mouth, difficulty urinating, and blurred vision.
Niacinamide Counseling: I recommended taking niacin or niacinamide, also know as vitamin B3, twice daily. Recent evidence suggests that taking vitamin B3 (500 mg twice daily) can reduce the risk of actinic keratoses and non-melanoma skin cancers. Side effects of vitamin B3 include flushing and headache.
Wartpeel Counseling:  I discussed with the patient the risks of Wartpeel including but not limited to erythema, scaling, itching, weeping, crusting, and pain.
Benzoyl Peroxide Pregnancy And Lactation Text: This medication is Pregnancy Category C. It is unknown if benzoyl peroxide is excreted in breast milk.
Glycopyrrolate Pregnancy And Lactation Text: This medication is Pregnancy Category B and is considered safe during pregnancy. It is unknown if it is excreted breast milk.
Erivedge Counseling- I discussed with the patient the risks of Erivedge including but not limited to nausea, vomiting, diarrhea, constipation, weight loss, changes in the sense of taste, decreased appetite, muscle spasms, and hair loss.  The patient verbalized understanding of the proper use and possible adverse effects of Erivedge.  All of the patient's questions and concerns were addressed.
Imiquimod Counseling:  I discussed with the patient the risks of imiquimod including but not limited to erythema, scaling, itching, weeping, crusting, and pain.  Patient understands that the inflammatory response to imiquimod is variable from person to person and was educated regarded proper titration schedule.  If flu-like symptoms develop, patient knows to discontinue the medication and contact us.
Cyclophosphamide Pregnancy And Lactation Text: This medication is Pregnancy Category D and it isn't considered safe during pregnancy. This medication is excreted in breast milk.
Rituxan Pregnancy And Lactation Text: This medication is Pregnancy Category C and it isn't know if it is safe during pregnancy. It is unknown if this medication is excreted in breast milk but similar antibodies are known to be excreted.
Cyclosporine Counseling:  I discussed with the patient the risks of cyclosporine including but not limited to hypertension, gingival hyperplasia,myelosuppression, immunosuppression, liver damage, kidney damage, neurotoxicity, lymphoma, and serious infections. The patient understands that monitoring is required including baseline blood pressure, CBC, CMP, lipid panel and uric acid, and then 1-2 times monthly CMP and blood pressure.
Rifampin Counseling: I discussed with the patient the risks of rifampin including but not limited to liver damage, kidney damage, red-orange body fluids, nausea/vomiting and severe allergy.
Use Enhanced Medication Counseling?: No
Doxepin Counseling:  Patient advised that the medication is sedating and not to drive a car after taking this medication. Patient informed of potential adverse effects including but not limited to dry mouth, urinary retention, and blurry vision.  The patient verbalized understanding of the proper use and possible adverse effects of doxepin.  All of the patient's questions and concerns were addressed.
Cephalexin Pregnancy And Lactation Text: This medication is Pregnancy Category B and considered safe during pregnancy.  It is also excreted in breast milk but can be used safely for shorter doses.
Methotrexate Pregnancy And Lactation Text: This medication is Pregnancy Category X and is known to cause fetal harm. This medication is excreted in breast milk.
Dapsone Counseling: I discussed with the patient the risks of dapsone including but not limited to hemolytic anemia, agranulocytosis, rashes, methemoglobinemia, kidney failure, peripheral neuropathy, headaches, GI upset, and liver toxicity.  Patients who start dapsone require monitoring including baseline LFTs and weekly CBCs for the first month, then every month thereafter.  The patient verbalized understanding of the proper use and possible adverse effects of dapsone.  All of the patient's questions and concerns were addressed.
Hydroxychloroquine Counseling:  I discussed with the patient that a baseline ophthalmologic exam is needed at the start of therapy and every year thereafter while on therapy. A CBC may also be warranted for monitoring.  The side effects of this medication were discussed with the patient, including but not limited to agranulocytosis, aplastic anemia, seizures, rashes, retinopathy, and liver toxicity. Patient instructed to call the office should any adverse effect occur.  The patient verbalized understanding of the proper use and possible adverse effects of Plaquenil.  All the patient's questions and concerns were addressed.
Doxycycline Counseling:  Patient counseled regarding possible photosensitivity and increased risk for sunburn.  Patient instructed to avoid sunlight, if possible.  When exposed to sunlight, patients should wear protective clothing, sunglasses, and sunscreen.  The patient was instructed to call the office immediately if the following severe adverse effects occur:  hearing changes, easy bruising/bleeding, severe headache, or vision changes.  The patient verbalized understanding of the proper use and possible adverse effects of doxycycline.  All of the patient's questions and concerns were addressed.
Tremfya Counseling: I discussed with the patient the risks of guselkumab including but not limited to immunosuppression, serious infections, worsening of inflammatory bowel disease and drug reactions.  The patient understands that monitoring is required including a PPD at baseline and must alert us or the primary physician if symptoms of infection or other concerning signs are noted.
Nsaids Counseling: NSAID Counseling: I discussed with the patient that NSAIDs should be taken with food. Prolonged use of NSAIDs can result in the development of stomach ulcers.  Patient advised to stop taking NSAIDs if abdominal pain occurs.  The patient verbalized understanding of the proper use and possible adverse effects of NSAIDs.  All of the patient's questions and concerns were addressed.
Ketoconazole Pregnancy And Lactation Text: This medication is Pregnancy Category C and it isn't know if it is safe during pregnancy. It is also excreted in breast milk and breast feeding isn't recommended.
Dupixent Pregnancy And Lactation Text: This medication likely crosses the placenta but the risk for the fetus is uncertain. This medication is excreted in breast milk.
Fluconazole Pregnancy And Lactation Text: This medication is Pregnancy Category C and it isn't know if it is safe during pregnancy. It is also excreted in breast milk.
Dupixent Counseling: I discussed with the patient the risks of dupilumab including but not limited to eye infection and irritation, cold sores, injection site reactions, worsening of asthma, allergic reactions and increased risk of parasitic infection.  Live vaccines should be avoided while taking dupilumab. Dupilumab will also interact with certain medications such as warfarin and cyclosporine. The patient understands that monitoring is required and they must alert us or the primary physician if symptoms of infection or other concerning signs are noted.
Xolair Pregnancy And Lactation Text: This medication is Pregnancy Category B and is considered safe during pregnancy. This medication is excreted in breast milk.
Valtrex Counseling: I discussed with the patient the risks of valacyclovir including but not limited to kidney damage, nausea, vomiting and severe allergy.  The patient understands that if the infection seems to be worsening or is not improving, they are to call.
Hydroxyzine Pregnancy And Lactation Text: This medication is not safe during pregnancy and should not be taken. It is also excreted in breast milk and breast feeding isn't recommended.
Solaraze Counseling:  I discussed with the patient the risks of Solaraze including but not limited to erythema, scaling, itching, weeping, crusting, and pain.
Stelara Counseling:  I discussed with the patient the risks of ustekinumab including but not limited to immunosuppression, malignancy, posterior leukoencephalopathy syndrome, and serious infections.  The patient understands that monitoring is required including a PPD at baseline and must alert us or the primary physician if symptoms of infection or other concerning signs are noted.
Cimetidine Counseling:  I discussed with the patient the risks of Cimetidine including but not limited to gynecomastia, headache, diarrhea, nausea, drowsiness, arrhythmias, pancreatitis, skin rashes, psychosis, bone marrow suppression and kidney toxicity.
Minoxidil Counseling: Minoxidil is a topical medication which can increase blood flow where it is applied. It is uncertain how this medication increases hair growth. Side effects are uncommon and include stinging and allergic reactions.
Siliq Counseling:  I discussed with the patient the risks of Siliq including but not limited to new or worsening depression, suicidal thoughts and behavior, immunosuppression, malignancy, posterior leukoencephalopathy syndrome, and serious infections.  The patient understands that monitoring is required including a PPD at baseline and must alert us or the primary physician if symptoms of infection or other concerning signs are noted. There is also a special program designed to monitor depression which is required with Siliq.
Clindamycin Counseling: I counseled the patient regarding use of clindamycin as an antibiotic for prophylactic and/or therapeutic purposes. Clindamycin is active against numerous classes of bacteria, including skin bacteria. Side effects may include nausea, diarrhea, gastrointestinal upset, rash, hives, yeast infections, and in rare cases, colitis.
Thalidomide Counseling: I discussed with the patient the risks of thalidomide including but not limited to birth defects, anxiety, weakness, chest pain, dizziness, cough and severe allergy.
Carac Counseling:  I discussed with the patient the risks of Carac including but not limited to erythema, scaling, itching, weeping, crusting, and pain.
Ilumya Counseling: I discussed with the patient the risks of tildrakizumab including but not limited to immunosuppression, malignancy, posterior leukoencephalopathy syndrome, and serious infections.  The patient understands that monitoring is required including a PPD at baseline and must alert us or the primary physician if symptoms of infection or other concerning signs are noted.
Colchicine Counseling:  Patient counseled regarding adverse effects including but not limited to stomach upset (nausea, vomiting, stomach pain, or diarrhea).  Patient instructed to limit alcohol consumption while taking this medication.  Colchicine may reduce blood counts especially with prolonged use.  The patient understands that monitoring of kidney function and blood counts may be required, especially at baseline. The patient verbalized understanding of the proper use and possible adverse effects of colchicine.  All of the patient's questions and concerns were addressed.
Ivermectin Counseling:  Patient instructed to take medication on an empty stomach with a full glass of water.  Patient informed of potential adverse effects including but not limited to nausea, diarrhea, dizziness, itching, and swelling of the extremities or lymph nodes.  The patient verbalized understanding of the proper use and possible adverse effects of ivermectin.  All of the patient's questions and concerns were addressed.
Enbrel Counseling:  I discussed with the patient the risks of etanercept including but not limited to myelosuppression, immunosuppression, autoimmune hepatitis, demyelinating diseases, lymphoma, and infections.  The patient understands that monitoring is required including a PPD at baseline and must alert us or the primary physician if symptoms of infection or other concerning signs are noted.
Birth Control Pills Counseling: Birth Control Pill Counseling: I discussed with the patient the potential side effects of OCPs including but not limited to increased risk of stroke, heart attack, thrombophlebitis, deep venous thrombosis, hepatic adenomas, breast changes, GI upset, headaches, and depression.  The patient verbalized understanding of the proper use and possible adverse effects of OCPs. All of the patient's questions and concerns were addressed.
Azathioprine Pregnancy And Lactation Text: This medication is Pregnancy Category D and isn't considered safe during pregnancy. It is unknown if this medication is excreted in breast milk.
Hydroquinone Counseling:  Patient advised that medication may result in skin irritation, lightening (hypopigmentation), dryness, and burning.  In the event of skin irritation, the patient was advised to reduce the amount of the drug applied or use it less frequently.  Rarely, spots that are treated with hydroquinone can become darker (pseudoochronosis).  Should this occur, patient instructed to stop medication and call the office. The patient verbalized understanding of the proper use and possible adverse effects of hydroquinone.  All of the patient's questions and concerns were addressed.
Cimzia Pregnancy And Lactation Text: This medication crosses the placenta but can be considered safe in certain situations. Cimzia may be excreted in breast milk.
Arava Counseling:  Patient counseled regarding adverse effects of Arava including but not limited to nausea, vomiting, abnormalities in liver function tests. Patients may develop mouth sores, rash, diarrhea, and abnormalities in blood counts. The patient understands that monitoring is required including LFTs and blood counts.  There is a rare possibility of scarring of the liver and lung problems that can occur when taking methotrexate. Persistent nausea, loss of appetite, pale stools, dark urine, cough, and shortness of breath should be reported immediately. Patient advised to discontinue Arava treatment and consult with a physician prior to attempting conception. The patient will have to undergo a treatment to eliminate Arava from the body prior to conception.
SSKI Counseling:  I discussed with the patient the risks of SSKI including but not limited to thyroid abnormalities, metallic taste, GI upset, fever, headache, acne, arthralgias, paraesthesias, lymphadenopathy, easy bleeding, arrhythmias, and allergic reaction.
Nsaids Pregnancy And Lactation Text: These medications are considered safe up to 30 weeks gestation. It is excreted in breast milk.
Dapsone Pregnancy And Lactation Text: This medication is Pregnancy Category C and is not considered safe during pregnancy or breast feeding.
Azithromycin Counseling:  I discussed with the patient the risks of azithromycin including but not limited to GI upset, allergic reaction, drug rash, diarrhea, and yeast infections.
Simponi Counseling:  I discussed with the patient the risks of golimumab including but not limited to myelosuppression, immunosuppression, autoimmune hepatitis, demyelinating diseases, lymphoma, and serious infections.  The patient understands that monitoring is required including a PPD at baseline and must alert us or the primary physician if symptoms of infection or other concerning signs are noted.
Drysol Pregnancy And Lactation Text: This medication is considered safe during pregnancy and breast feeding.
Itraconazole Counseling:  I discussed with the patient the risks of itraconazole including but not limited to liver damage, nausea/vomiting, neuropathy, and severe allergy.  The patient understands that this medication is best absorbed when taken with acidic beverages such as non-diet cola or ginger ale.  The patient understands that monitoring is required including baseline LFTs and repeat LFTs at intervals.  The patient understands that they are to contact us or the primary physician if concerning signs are noted.
Acitretin Pregnancy And Lactation Text: This medication is Pregnancy Category X and should not be given to women who are pregnant or may become pregnant in the future. This medication is excreted in breast milk.
Zyclara Counseling:  I discussed with the patient the risks of imiquimod including but not limited to erythema, scaling, itching, weeping, crusting, and pain.  Patient understands that the inflammatory response to imiquimod is variable from person to person and was educated regarded proper titration schedule.  If flu-like symptoms develop, patient knows to discontinue the medication and contact us.
Niacinamide Pregnancy And Lactation Text: These medications are considered safe during pregnancy.
5-Fu Counseling: 5-Fluorouracil Counseling:  I discussed with the patient the risks of 5-fluorouracil including but not limited to erythema, scaling, itching, weeping, crusting, and pain.
Clindamycin Pregnancy And Lactation Text: This medication can be used in pregnancy if certain situations. Clindamycin is also present in breast milk.
Rifampin Pregnancy And Lactation Text: This medication is Pregnancy Category C and it isn't know if it is safe during pregnancy. It is also excreted in breast milk and should not be used if you are breast feeding.
Taltz Counseling: I discussed with the patient the risks of ixekizumab including but not limited to immunosuppression, serious infections, worsening of inflammatory bowel disease and drug reactions.  The patient understands that monitoring is required including a PPD at baseline and must alert us or the primary physician if symptoms of infection or other concerning signs are noted.
Elidel Counseling: Patient may experience a mild burning sensation during topical application. Elidel is not approved in children less than 2 years of age. There have been case reports of hematologic and skin malignancies in patients using topical calcineurin inhibitors although causality is questionable.
Birth Control Pills Pregnancy And Lactation Text: This medication should be avoided if pregnant and for the first 30 days post-partum.
Isotretinoin Counseling: Patient should get monthly blood tests, not donate blood, not drive at night if vision affected, not share medication, and not undergo elective surgery for 6 months after tx completed. Side effects reviewed, pt to contact office should one occur.
Valtrex Pregnancy And Lactation Text: this medication is Pregnancy Category B and is considered safe during pregnancy. This medication is not directly found in breast milk but it's metabolite acyclovir is present.
Isotretinoin Pregnancy And Lactation Text: This medication is Pregnancy Category X and is considered extremely dangerous during pregnancy. It is unknown if it is excreted in breast milk.
Hydroxyzine Counseling: Patient advised that the medication is sedating and not to drive a car after taking this medication.  Patient informed of potential adverse effects including but not limited to dry mouth, urinary retention, and blurry vision.  The patient verbalized understanding of the proper use and possible adverse effects of hydroxyzine.  All of the patient's questions and concerns were addressed.
Cellcept Counseling:  I discussed with the patient the risks of mycophenolate mofetil including but not limited to infection/immunosuppression, GI upset, hypokalemia, hypercholesterolemia, bone marrow suppression, lymphoproliferative disorders, malignancy, GI ulceration/bleed/perforation, colitis, interstitial lung disease, kidney failure, progressive multifocal leukoencephalopathy, and birth defects.  The patient understands that monitoring is required including a baseline creatinine and regular CBC testing. In addition, patient must alert us immediately if symptoms of infection or other concerning signs are noted.
Cyclophosphamide Counseling:  I discussed with the patient the risks of cyclophosphamide including but not limited to hair loss, hormonal abnormalities, decreased fertility, abdominal pain, diarrhea, nausea and vomiting, bone marrow suppression and infection. The patient understands that monitoring is required while taking this medication.
Terbinafine Pregnancy And Lactation Text: This medication is Pregnancy Category B and is considered safe during pregnancy. It is also excreted in breast milk and breast feeding isn't recommended.
Infliximab Counseling:  I discussed with the patient the risks of infliximab including but not limited to myelosuppression, immunosuppression, autoimmune hepatitis, demyelinating diseases, lymphoma, and serious infections.  The patient understands that monitoring is required including a PPD at baseline and must alert us or the primary physician if symptoms of infection or other concerning signs are noted.
Odomzo Counseling- I discussed with the patient the risks of Odomzo including but not limited to nausea, vomiting, diarrhea, constipation, weight loss, changes in the sense of taste, decreased appetite, muscle spasms, and hair loss.  The patient verbalized understanding of the proper use and possible adverse effects of Odomzo.  All of the patient's questions and concerns were addressed.
Topical Sulfur Applications Pregnancy And Lactation Text: This medication is Pregnancy Category C and has an unknown safety profile during pregnancy. It is unknown if this topical medication is excreted in breast milk.
Acitretin Counseling:  I discussed with the patient the risks of acitretin including but not limited to hair loss, dry lips/skin/eyes, liver damage, hyperlipidemia, depression/suicidal ideation, photosensitivity.  Serious rare side effects can include but are not limited to pancreatitis, pseudotumor cerebri, bony changes, clot formation/stroke/heart attack.  Patient understands that alcohol is contraindicated since it can result in liver toxicity and significantly prolong the elimination of the drug by many years.
Quinolones Counseling:  I discussed with the patient the risks of fluoroquinolones including but not limited to GI upset, allergic reaction, drug rash, diarrhea, dizziness, photosensitivity, yeast infections, liver function test abnormalities, tendonitis/tendon rupture.
Spironolactone Counseling: Patient advised regarding risks of diarrhea, abdominal pain, hyperkalemia, birth defects (for female patients), liver toxicity and renal toxicity. The patient may need blood work to monitor liver and kidney function and potassium levels while on therapy. The patient verbalized understanding of the proper use and possible adverse effects of spironolactone.  All of the patient's questions and concerns were addressed.
Otezla Pregnancy And Lactation Text: This medication is Pregnancy Category C and it isn't known if it is safe during pregnancy. It is unknown if it is excreted in breast milk.
Tazorac Counseling:  Patient advised that medication is irritating and drying.  Patient may need to apply sparingly and wash off after an hour before eventually leaving it on overnight.  The patient verbalized understanding of the proper use and possible adverse effects of tazorac.  All of the patient's questions and concerns were addressed.

## 2019-02-26 NOTE — PROCEDURE: TREATMENT REGIMEN
Detail Level: Zone
Plan: Advised to discuss with PCP about Spironolactone. Patient has hx of high blood pressure. Will revisit at next appointment if needed. RTC as needed
Initiate Treatment: Aczone 5% Qhs

## 2019-02-26 NOTE — PROCEDURE: DYSPORT (U OR CC)
Inferior Lateral Orbicularis Oculi Units: 0
Dilution (U/0.1 Cc): 30
Consent: Written consent obtained. Risks include but not limited to lid/brow ptosis, bruising, swelling, diplopia, temporary effect, incomplete chemical denervation.
Price (Use Numbers Only, No Special Characters Or $): 262.28
Glabellar Complex Units: 45
Post-Care Instructions: Patient instructed to use the muscles injected for the next hour.  Do not massage the area or lie down for 4 hours and no vigorous physical activity for 24 hours.
Detail Level: Detailed
Quantity Per Injection Site (Units Or Cc): 15 U

## 2019-02-26 NOTE — PROCEDURE: MEDICATION COUNSELING
Dupixent Counseling: I discussed with the patient the risks of dupilumab including but not limited to eye infection and irritation, cold sores, injection site reactions, worsening of asthma, allergic reactions and increased risk of parasitic infection.  Live vaccines should be avoided while taking dupilumab. Dupilumab will also interact with certain medications such as warfarin and cyclosporine. The patient understands that monitoring is required and they must alert us or the primary physician if symptoms of infection or other concerning signs are noted.
Rifampin Pregnancy And Lactation Text: This medication is Pregnancy Category C and it isn't know if it is safe during pregnancy. It is also excreted in breast milk and should not be used if you are breast feeding.
Taltz Pregnancy And Lactation Text: The risk during pregnancy and breastfeeding is uncertain with this medication.
Picato Counseling:  I discussed with the patient the risks of Picato including but not limited to erythema, scaling, itching, weeping, crusting, and pain.
Oxybutynin Counseling:  I discussed with the patient the risks of oxybutynin including but not limited to skin rash, drowsiness, dry mouth, difficulty urinating, and blurred vision.
Albendazole Pregnancy And Lactation Text: This medication is Pregnancy Category C and it isn't known if it is safe during pregnancy. It is also excreted in breast milk.
Cellcept Counseling:  I discussed with the patient the risks of mycophenolate mofetil including but not limited to infection/immunosuppression, GI upset, hypokalemia, hypercholesterolemia, bone marrow suppression, lymphoproliferative disorders, malignancy, GI ulceration/bleed/perforation, colitis, interstitial lung disease, kidney failure, progressive multifocal leukoencephalopathy, and birth defects.  The patient understands that monitoring is required including a baseline creatinine and regular CBC testing. In addition, patient must alert us immediately if symptoms of infection or other concerning signs are noted.
Azathioprine Pregnancy And Lactation Text: This medication is Pregnancy Category D and isn't considered safe during pregnancy. It is unknown if this medication is excreted in breast milk.
Otezla Pregnancy And Lactation Text: This medication is Pregnancy Category C and it isn't known if it is safe during pregnancy. It is unknown if it is excreted in breast milk.
Tazorac Pregnancy And Lactation Text: This medication is not safe during pregnancy. It is unknown if this medication is excreted in breast milk.
Thalidomide Pregnancy And Lactation Text: This medication is Pregnancy Category X and is absolutely contraindicated during pregnancy. It is unknown if it is excreted in breast milk.
Infliximab Pregnancy And Lactation Text: This medication is Pregnancy Category B and is considered safe during pregnancy. It is unknown if this medication is excreted in breast milk.
Doxycycline Pregnancy And Lactation Text: This medication is Pregnancy Category D and not consider safe during pregnancy. It is also excreted in breast milk but is considered safe for shorter treatment courses.
Prednisone Pregnancy And Lactation Text: This medication is Pregnancy Category C and it isn't know if it is safe during pregnancy. This medication is excreted in breast milk.
Elidel Counseling: Patient may experience a mild burning sensation during topical application. Elidel is not approved in children less than 2 years of age. There have been case reports of hematologic and skin malignancies in patients using topical calcineurin inhibitors although causality is questionable.
Arava Counseling:  Patient counseled regarding adverse effects of Arava including but not limited to nausea, vomiting, abnormalities in liver function tests. Patients may develop mouth sores, rash, diarrhea, and abnormalities in blood counts. The patient understands that monitoring is required including LFTs and blood counts.  There is a rare possibility of scarring of the liver and lung problems that can occur when taking methotrexate. Persistent nausea, loss of appetite, pale stools, dark urine, cough, and shortness of breath should be reported immediately. Patient advised to discontinue Arava treatment and consult with a physician prior to attempting conception. The patient will have to undergo a treatment to eliminate Arava from the body prior to conception.
Picato Pregnancy And Lactation Text: This medication is Pregnancy Category C. It is unknown if this medication is excreted in breast milk.
Oxybutynin Pregnancy And Lactation Text: This medication is Pregnancy Category B and is considered safe during pregnancy. It is unknown if it is excreted in breast milk.
Dupixent Pregnancy And Lactation Text: This medication likely crosses the placenta but the risk for the fetus is uncertain. This medication is excreted in breast milk.
Tetracycline Counseling: Patient counseled regarding possible photosensitivity and increased risk for sunburn.  Patient instructed to avoid sunlight, if possible.  When exposed to sunlight, patients should wear protective clothing, sunglasses, and sunscreen.  The patient was instructed to call the office immediately if the following severe adverse effects occur:  hearing changes, easy bruising/bleeding, severe headache, or vision changes.  The patient verbalized understanding of the proper use and possible adverse effects of tetracycline.  All of the patient's questions and concerns were addressed. Patient understands to avoid pregnancy while on therapy due to potential birth defects.
Benzoyl Peroxide Counseling: Patient counseled that medicine may cause skin irritation and bleach clothing.  In the event of skin irritation, the patient was advised to reduce the amount of the drug applied or use it less frequently.   The patient verbalized understanding of the proper use and possible adverse effects of benzoyl peroxide.  All of the patient's questions and concerns were addressed.
Gabapentin Pregnancy And Lactation Text: This medication is Pregnancy Category C and isn't considered safe during pregnancy. It is excreted in breast milk.
Valtrex Pregnancy And Lactation Text: this medication is Pregnancy Category B and is considered safe during pregnancy. This medication is not directly found in breast milk but it's metabolite acyclovir is present.
Azithromycin Pregnancy And Lactation Text: This medication is considered safe during pregnancy and is also secreted in breast milk.
Rituxan Pregnancy And Lactation Text: This medication is Pregnancy Category C and it isn't know if it is safe during pregnancy. It is unknown if this medication is excreted in breast milk but similar antibodies are known to be excreted.
Ivermectin Counseling:  Patient instructed to take medication on an empty stomach with a full glass of water.  Patient informed of potential adverse effects including but not limited to nausea, diarrhea, dizziness, itching, and swelling of the extremities or lymph nodes.  The patient verbalized understanding of the proper use and possible adverse effects of ivermectin.  All of the patient's questions and concerns were addressed.
Acitretin Counseling:  I discussed with the patient the risks of acitretin including but not limited to hair loss, dry lips/skin/eyes, liver damage, hyperlipidemia, depression/suicidal ideation, photosensitivity.  Serious rare side effects can include but are not limited to pancreatitis, pseudotumor cerebri, bony changes, clot formation/stroke/heart attack.  Patient understands that alcohol is contraindicated since it can result in liver toxicity and significantly prolong the elimination of the drug by many years.
Topical Clindamycin Counseling: Patient counseled that this medication may cause skin irritation or allergic reactions.  In the event of skin irritation, the patient was advised to reduce the amount of the drug applied or use it less frequently.   The patient verbalized understanding of the proper use and possible adverse effects of clindamycin.  All of the patient's questions and concerns were addressed.
Rituxan Counseling:  I discussed with the patient the risks of Rituxan infusions. Side effects can include infusion reactions, severe drug rashes including mucocutaneous reactions, reactivation of latent hepatitis and other infections and rarely progressive multifocal leukoencephalopathy.  All of the patient's questions and concerns were addressed.
Azithromycin Counseling:  I discussed with the patient the risks of azithromycin including but not limited to GI upset, allergic reaction, drug rash, diarrhea, and yeast infections.
Gabapentin Counseling: I discussed with the patient the risks of gabapentin including but not limited to dizziness, somnolence, fatigue and ataxia.
Valtrex Counseling: I discussed with the patient the risks of valacyclovir including but not limited to kidney damage, nausea, vomiting and severe allergy.  The patient understands that if the infection seems to be worsening or is not improving, they are to call.
Erythromycin Counseling:  I discussed with the patient the risks of erythromycin including but not limited to GI upset, allergic reaction, drug rash, diarrhea, increase in liver enzymes, and yeast infections.
Tremfya Counseling: I discussed with the patient the risks of guselkumab including but not limited to immunosuppression, serious infections, worsening of inflammatory bowel disease and drug reactions.  The patient understands that monitoring is required including a PPD at baseline and must alert us or the primary physician if symptoms of infection or other concerning signs are noted.
Cimetidine Counseling:  I discussed with the patient the risks of Cimetidine including but not limited to gynecomastia, headache, diarrhea, nausea, drowsiness, arrhythmias, pancreatitis, skin rashes, psychosis, bone marrow suppression and kidney toxicity.
Cyclophosphamide Counseling:  I discussed with the patient the risks of cyclophosphamide including but not limited to hair loss, hormonal abnormalities, decreased fertility, abdominal pain, diarrhea, nausea and vomiting, bone marrow suppression and infection. The patient understands that monitoring is required while taking this medication.
Bactrim Counseling:  I discussed with the patient the risks of sulfa antibiotics including but not limited to GI upset, allergic reaction, drug rash, diarrhea, dizziness, photosensitivity, and yeast infections.  Rarely, more serious reactions can occur including but not limited to aplastic anemia, agranulocytosis, methemoglobinemia, blood dyscrasias, liver or kidney failure, lung infiltrates or desquamative/blistering drug rashes.
Clofazimine Counseling:  I discussed with the patient the risks of clofazimine including but not limited to skin and eye pigmentation, liver damage, nausea/vomiting, gastrointestinal bleeding and allergy.
Birth Control Pills Counseling: Birth Control Pill Counseling: I discussed with the patient the potential side effects of OCPs including but not limited to increased risk of stroke, heart attack, thrombophlebitis, deep venous thrombosis, hepatic adenomas, breast changes, GI upset, headaches, and depression.  The patient verbalized understanding of the proper use and possible adverse effects of OCPs. All of the patient's questions and concerns were addressed.
Cimetidine Pregnancy And Lactation Text: This medication is Pregnancy Category B and is considered safe during pregnancy. It is also excreted in breast milk and breast feeding isn't recommended.
Topical Sulfur Applications Counseling: Topical Sulfur Counseling: Patient counseled that this medication may cause skin irritation or allergic reactions.  In the event of skin irritation, the patient was advised to reduce the amount of the drug applied or use it less frequently.   The patient verbalized understanding of the proper use and possible adverse effects of topical sulfur application.  All of the patient's questions and concerns were addressed.
Benzoyl Peroxide Pregnancy And Lactation Text: This medication is Pregnancy Category C. It is unknown if benzoyl peroxide is excreted in breast milk.
Siliq Counseling:  I discussed with the patient the risks of Siliq including but not limited to new or worsening depression, suicidal thoughts and behavior, immunosuppression, malignancy, posterior leukoencephalopathy syndrome, and serious infections.  The patient understands that monitoring is required including a PPD at baseline and must alert us or the primary physician if symptoms of infection or other concerning signs are noted. There is also a special program designed to monitor depression which is required with Siliq.
Acitretin Pregnancy And Lactation Text: This medication is Pregnancy Category X and should not be given to women who are pregnant or may become pregnant in the future. This medication is excreted in breast milk.
Metronidazole Counseling:  I discussed with the patient the risks of metronidazole including but not limited to seizures, nausea/vomiting, a metallic taste in the mouth, nausea/vomiting and severe allergy.
Glycopyrrolate Counseling:  I discussed with the patient the risks of glycopyrrolate including but not limited to skin rash, drowsiness, dry mouth, difficulty urinating, and blurred vision.
Use Enhanced Medication Counseling?: No
Enbrel Counseling:  I discussed with the patient the risks of etanercept including but not limited to myelosuppression, immunosuppression, autoimmune hepatitis, demyelinating diseases, lymphoma, and infections.  The patient understands that monitoring is required including a PPD at baseline and must alert us or the primary physician if symptoms of infection or other concerning signs are noted.
Eucrisa Pregnancy And Lactation Text: This medication has not been assigned a Pregnancy Risk Category but animal studies failed to show danger with the topical medication. It is unknown if the medication is excreted in breast milk.
Eucrisa Counseling: Patient may experience a mild burning sensation during topical application. Eucrisa is not approved in children less than 2 years of age.
Erythromycin Pregnancy And Lactation Text: This medication is Pregnancy Category B and is considered safe during pregnancy. It is also excreted in breast milk.
Tetracycline Pregnancy And Lactation Text: This medication is Pregnancy Category D and not consider safe during pregnancy. It is also excreted in breast milk.
Protopic Counseling: Patient may experience a mild burning sensation during topical application. Protopic is not approved in children less than 2 years of age. There have been case reports of hematologic and skin malignancies in patients using topical calcineurin inhibitors although causality is questionable.
Detail Level: Simple
Glycopyrrolate Pregnancy And Lactation Text: This medication is Pregnancy Category B and is considered safe during pregnancy. It is unknown if it is excreted breast milk.
Itraconazole Counseling:  I discussed with the patient the risks of itraconazole including but not limited to liver damage, nausea/vomiting, neuropathy, and severe allergy.  The patient understands that this medication is best absorbed when taken with acidic beverages such as non-diet cola or ginger ale.  The patient understands that monitoring is required including baseline LFTs and repeat LFTs at intervals.  The patient understands that they are to contact us or the primary physician if concerning signs are noted.
Bexarotene Counseling:  I discussed with the patient the risks of bexarotene including but not limited to hair loss, dry lips/skin/eyes, liver abnormalities, hyperlipidemia, pancreatitis, depression/suicidal ideation, photosensitivity, drug rash/allergic reactions, hypothyroidism, anemia, leukopenia, infection, cataracts, and teratogenicity.  Patient understands that they will need regular blood tests to check lipid profile, liver function tests, white blood cell count, thyroid function tests and pregnancy test if applicable.
Humira Counseling:  I discussed with the patient the risks of adalimumab including but not limited to myelosuppression, immunosuppression, autoimmune hepatitis, demyelinating diseases, lymphoma, and serious infections.  The patient understands that monitoring is required including a PPD at baseline and must alert us or the primary physician if symptoms of infection or other concerning signs are noted.
Doxepin Counseling:  Patient advised that the medication is sedating and not to drive a car after taking this medication. Patient informed of potential adverse effects including but not limited to dry mouth, urinary retention, and blurry vision.  The patient verbalized understanding of the proper use and possible adverse effects of doxepin.  All of the patient's questions and concerns were addressed.
Hydroquinone Counseling:  Patient advised that medication may result in skin irritation, lightening (hypopigmentation), dryness, and burning.  In the event of skin irritation, the patient was advised to reduce the amount of the drug applied or use it less frequently.  Rarely, spots that are treated with hydroquinone can become darker (pseudoochronosis).  Should this occur, patient instructed to stop medication and call the office. The patient verbalized understanding of the proper use and possible adverse effects of hydroquinone.  All of the patient's questions and concerns were addressed.
Xelsharronz Pregnancy And Lactation Text: This medication is Pregnancy Category D and is not considered safe during pregnancy.  The risk during breast feeding is also uncertain.
Metronidazole Pregnancy And Lactation Text: This medication is Pregnancy Category B and considered safe during pregnancy.  It is also excreted in breast milk.
Nsaids Counseling: NSAID Counseling: I discussed with the patient that NSAIDs should be taken with food. Prolonged use of NSAIDs can result in the development of stomach ulcers.  Patient advised to stop taking NSAIDs if abdominal pain occurs.  The patient verbalized understanding of the proper use and possible adverse effects of NSAIDs.  All of the patient's questions and concerns were addressed.
Niacinamide Pregnancy And Lactation Text: These medications are considered safe during pregnancy.
Xeljanz Counseling: I discussed with the patient the risks of Xeljanz therapy including increased risk of infection, liver issues, headache, diarrhea, or cold symptoms. Live vaccines should be avoided. They were instructed to call if they have any problems.
Protopic Pregnancy And Lactation Text: This medication is Pregnancy Category C. It is unknown if this medication is excreted in breast milk when applied topically.
Birth Control Pills Pregnancy And Lactation Text: This medication should be avoided if pregnant and for the first 30 days post-partum.
Bactrim Pregnancy And Lactation Text: This medication is Pregnancy Category D and is known to cause fetal risk.  It is also excreted in breast milk.
Cyclophosphamide Pregnancy And Lactation Text: This medication is Pregnancy Category D and it isn't considered safe during pregnancy. This medication is excreted in breast milk.
Carac Counseling:  I discussed with the patient the risks of Carac including but not limited to erythema, scaling, itching, weeping, crusting, and pain.
Topical Sulfur Applications Pregnancy And Lactation Text: This medication is Pregnancy Category C and has an unknown safety profile during pregnancy. It is unknown if this topical medication is excreted in breast milk.
Nsaids Pregnancy And Lactation Text: These medications are considered safe up to 30 weeks gestation. It is excreted in breast milk.
Minocycline Counseling: Patient advised regarding possible photosensitivity and discoloration of the teeth, skin, lips, tongue and gums.  Patient instructed to avoid sunlight, if possible.  When exposed to sunlight, patients should wear protective clothing, sunglasses, and sunscreen.  The patient was instructed to call the office immediately if the following severe adverse effects occur:  hearing changes, easy bruising/bleeding, severe headache, or vision changes.  The patient verbalized understanding of the proper use and possible adverse effects of minocycline.  All of the patient's questions and concerns were addressed.
Xolair Counseling:  Patient informed of potential adverse effects including but not limited to fever, muscle aches, rash and allergic reactions.  The patient verbalized understanding of the proper use and possible adverse effects of Xolair.  All of the patient's questions and concerns were addressed.
Fluconazole Pregnancy And Lactation Text: This medication is Pregnancy Category C and it isn't know if it is safe during pregnancy. It is also excreted in breast milk.
Solaraze Pregnancy And Lactation Text: This medication is Pregnancy Category B and is considered safe. There is some data to suggest avoiding during the third trimester. It is unknown if this medication is excreted in breast milk.
Doxepin Pregnancy And Lactation Text: This medication is Pregnancy Category C and it isn't known if it is safe during pregnancy. It is also excreted in breast milk and breast feeding isn't recommended.
Solaraze Counseling:  I discussed with the patient the risks of Solaraze including but not limited to erythema, scaling, itching, weeping, crusting, and pain.
Hydroxyzine Counseling: Patient advised that the medication is sedating and not to drive a car after taking this medication.  Patient informed of potential adverse effects including but not limited to dry mouth, urinary retention, and blurry vision.  The patient verbalized understanding of the proper use and possible adverse effects of hydroxyzine.  All of the patient's questions and concerns were addressed.
Fluconazole Counseling:  Patient counseled regarding adverse effects of fluconazole including but not limited to headache, diarrhea, nausea, upset stomach, liver function test abnormalities, taste disturbance, and stomach pain.  There is a rare possibility of liver failure that can occur when taking fluconazole.  The patient understands that monitoring of LFTs and kidney function test may be required, especially at baseline. The patient verbalized understanding of the proper use and possible adverse effects of fluconazole.  All of the patient's questions and concerns were addressed.
Cyclosporine Counseling:  I discussed with the patient the risks of cyclosporine including but not limited to hypertension, gingival hyperplasia,myelosuppression, immunosuppression, liver damage, kidney damage, neurotoxicity, lymphoma, and serious infections. The patient understands that monitoring is required including baseline blood pressure, CBC, CMP, lipid panel and uric acid, and then 1-2 times monthly CMP and blood pressure.
Colchicine Counseling:  Patient counseled regarding adverse effects including but not limited to stomach upset (nausea, vomiting, stomach pain, or diarrhea).  Patient instructed to limit alcohol consumption while taking this medication.  Colchicine may reduce blood counts especially with prolonged use.  The patient understands that monitoring of kidney function and blood counts may be required, especially at baseline. The patient verbalized understanding of the proper use and possible adverse effects of colchicine.  All of the patient's questions and concerns were addressed.
Spironolactone Counseling: Patient advised regarding risks of diarrhea, abdominal pain, hyperkalemia, birth defects (for female patients), liver toxicity and renal toxicity. The patient may need blood work to monitor liver and kidney function and potassium levels while on therapy. The patient verbalized understanding of the proper use and possible adverse effects of spironolactone.  All of the patient's questions and concerns were addressed.
Carac Pregnancy And Lactation Text: This medication is Pregnancy Category X and contraindicated in pregnancy and in women who may become pregnant. It is unknown if this medication is excreted in breast milk.
Wartpeel Counseling:  I discussed with the patient the risks of Wartpeel including but not limited to erythema, scaling, itching, weeping, crusting, and pain.
Hydroxychloroquine Counseling:  I discussed with the patient that a baseline ophthalmologic exam is needed at the start of therapy and every year thereafter while on therapy. A CBC may also be warranted for monitoring.  The side effects of this medication were discussed with the patient, including but not limited to agranulocytosis, aplastic anemia, seizures, rashes, retinopathy, and liver toxicity. Patient instructed to call the office should any adverse effect occur.  The patient verbalized understanding of the proper use and possible adverse effects of Plaquenil.  All the patient's questions and concerns were addressed.
Simponi Counseling:  I discussed with the patient the risks of golimumab including but not limited to myelosuppression, immunosuppression, autoimmune hepatitis, demyelinating diseases, lymphoma, and serious infections.  The patient understands that monitoring is required including a PPD at baseline and must alert us or the primary physician if symptoms of infection or other concerning signs are noted.
Bexarotene Pregnancy And Lactation Text: This medication is Pregnancy Category X and should not be given to women who are pregnant or may become pregnant. This medication should not be used if you are breast feeding.
Cephalexin Counseling: I counseled the patient regarding use of cephalexin as an antibiotic for prophylactic and/or therapeutic purposes. Cephalexin (commonly prescribed under brand name Keflex) is a cephalosporin antibiotic which is active against numerous classes of bacteria, including most skin bacteria. Side effects may include nausea, diarrhea, gastrointestinal upset, rash, hives, yeast infections, and in rare cases, hepatitis, kidney disease, seizures, fever, confusion, neurologic symptoms, and others. Patients with severe allergies to penicillin medications are cautioned that there is about a 10% incidence of cross-reactivity with cephalosporins. When possible, patients with penicillin allergies should use alternatives to cephalosporins for antibiotic therapy.
Xolair Pregnancy And Lactation Text: This medication is Pregnancy Category B and is considered safe during pregnancy. This medication is excreted in breast milk.
Odomzo Counseling- I discussed with the patient the risks of Odomzo including but not limited to nausea, vomiting, diarrhea, constipation, weight loss, changes in the sense of taste, decreased appetite, muscle spasms, and hair loss.  The patient verbalized understanding of the proper use and possible adverse effects of Odomzo.  All of the patient's questions and concerns were addressed.
Griseofulvin Counseling:  I discussed with the patient the risks of griseofulvin including but not limited to photosensitivity, cytopenia, liver damage, nausea/vomiting and severe allergy.  The patient understands that this medication is best absorbed when taken with a fatty meal (e.g., ice cream or french fries).
Topical Retinoid counseling:  Patient advised to apply a pea-sized amount only at bedtime and wait 30 minutes after washing their face before applying.  If too drying, patient may add a non-comedogenic moisturizer. The patient verbalized understanding of the proper use and possible adverse effects of retinoids.  All of the patient's questions and concerns were addressed.
Dapsone Counseling: I discussed with the patient the risks of dapsone including but not limited to hemolytic anemia, agranulocytosis, rashes, methemoglobinemia, kidney failure, peripheral neuropathy, headaches, GI upset, and liver toxicity.  Patients who start dapsone require monitoring including baseline LFTs and weekly CBCs for the first month, then every month thereafter.  The patient verbalized understanding of the proper use and possible adverse effects of dapsone.  All of the patient's questions and concerns were addressed.
SSKI Counseling:  I discussed with the patient the risks of SSKI including but not limited to thyroid abnormalities, metallic taste, GI upset, fever, headache, acne, arthralgias, paraesthesias, lymphadenopathy, easy bleeding, arrhythmias, and allergic reaction.
Methotrexate Counseling:  Patient counseled regarding adverse effects of methotrexate including but not limited to nausea, vomiting, abnormalities in liver function tests. Patients may develop mouth sores, rash, diarrhea, and abnormalities in blood counts. The patient understands that monitoring is required including LFT's and blood counts.  There is a rare possibility of scarring of the liver and lung problems that can occur when taking methotrexate. Persistent nausea, loss of appetite, pale stools, dark urine, cough, and shortness of breath should be reported immediately. Patient advised to discontinue methotrexate treatment at least three months before attempting to become pregnant.  I discussed the need for folate supplements while taking methotrexate.  These supplements can decrease side effects during methotrexate treatment. The patient verbalized understanding of the proper use and possible adverse effects of methotrexate.  All of the patient's questions and concerns were addressed.
Cimzia Counseling:  I discussed with the patient the risks of Cimzia including but not limited to immunosuppression, allergic reactions and infections.  The patient understands that monitoring is required including a PPD at baseline and must alert us or the primary physician if symptoms of infection or other concerning signs are noted.
Clindamycin Counseling: I counseled the patient regarding use of clindamycin as an antibiotic for prophylactic and/or therapeutic purposes. Clindamycin is active against numerous classes of bacteria, including skin bacteria. Side effects may include nausea, diarrhea, gastrointestinal upset, rash, hives, yeast infections, and in rare cases, colitis.
Zyclara Counseling:  I discussed with the patient the risks of imiquimod including but not limited to erythema, scaling, itching, weeping, crusting, and pain.  Patient understands that the inflammatory response to imiquimod is variable from person to person and was educated regarded proper titration schedule.  If flu-like symptoms develop, patient knows to discontinue the medication and contact us.
Ketoconazole Counseling:   Patient counseled regarding improving absorption with orange juice.  Adverse effects include but are not limited to breast enlargement, headache, diarrhea, nausea, upset stomach, liver function test abnormalities, taste disturbance, and stomach pain.  There is a rare possibility of liver failure that can occur when taking ketoconazole. The patient understands that monitoring of LFTs may be required, especially at baseline. The patient verbalized understanding of the proper use and possible adverse effects of ketoconazole.  All of the patient's questions and concerns were addressed.
Cephalexin Pregnancy And Lactation Text: This medication is Pregnancy Category B and considered safe during pregnancy.  It is also excreted in breast milk but can be used safely for shorter doses.
Cimzia Pregnancy And Lactation Text: This medication crosses the placenta but can be considered safe in certain situations. Cimzia may be excreted in breast milk.
Spironolactone Pregnancy And Lactation Text: This medication can cause feminization of the male fetus and should be avoided during pregnancy. The active metabolite is also found in breast milk.
Ketoconazole Pregnancy And Lactation Text: This medication is Pregnancy Category C and it isn't know if it is safe during pregnancy. It is also excreted in breast milk and breast feeding isn't recommended.
5-Fu Counseling: 5-Fluorouracil Counseling:  I discussed with the patient the risks of 5-fluorouracil including but not limited to erythema, scaling, itching, weeping, crusting, and pain.
Hydroxyzine Pregnancy And Lactation Text: This medication is not safe during pregnancy and should not be taken. It is also excreted in breast milk and breast feeding isn't recommended.
Isotretinoin Counseling: Patient should get monthly blood tests, not donate blood, not drive at night if vision affected, not share medication, and not undergo elective surgery for 6 months after tx completed. Side effects reviewed, pt to contact office should one occur.
Hydroxychloroquine Pregnancy And Lactation Text: This medication has been shown to cause fetal harm but it isn't assigned a Pregnancy Risk Category. There are small amounts excreted in breast milk.
Imiquimod Counseling:  I discussed with the patient the risks of imiquimod including but not limited to erythema, scaling, itching, weeping, crusting, and pain.  Patient understands that the inflammatory response to imiquimod is variable from person to person and was educated regarded proper titration schedule.  If flu-like symptoms develop, patient knows to discontinue the medication and contact us.
Ilumya Counseling: I discussed with the patient the risks of tildrakizumab including but not limited to immunosuppression, malignancy, posterior leukoencephalopathy syndrome, and serious infections.  The patient understands that monitoring is required including a PPD at baseline and must alert us or the primary physician if symptoms of infection or other concerning signs are noted.
Griseofulvin Pregnancy And Lactation Text: This medication is Pregnancy Category X and is known to cause serious birth defects. It is unknown if this medication is excreted in breast milk but breast feeding should be avoided.
Methotrexate Pregnancy And Lactation Text: This medication is Pregnancy Category X and is known to cause fetal harm. This medication is excreted in breast milk.
Dapsone Pregnancy And Lactation Text: This medication is Pregnancy Category C and is not considered safe during pregnancy or breast feeding.
Sski Pregnancy And Lactation Text: This medication is Pregnancy Category D and isn't considered safe during pregnancy. It is excreted in breast milk.
Drysol Counseling:  I discussed with the patient the risks of drysol/aluminum chloride including but not limited to skin rash, itching, irritation, burning.
Clindamycin Pregnancy And Lactation Text: This medication can be used in pregnancy if certain situations. Clindamycin is also present in breast milk.
High Dose Vitamin A Counseling: Side effects reviewed, pt to contact office should one occur.
Niacinamide Counseling: I recommended taking niacin or niacinamide, also know as vitamin B3, twice daily. Recent evidence suggests that taking vitamin B3 (500 mg twice daily) can reduce the risk of actinic keratoses and non-melanoma skin cancers. Side effects of vitamin B3 include flushing and headache.
Stelara Counseling:  I discussed with the patient the risks of ustekinumab including but not limited to immunosuppression, malignancy, posterior leukoencephalopathy syndrome, and serious infections.  The patient understands that monitoring is required including a PPD at baseline and must alert us or the primary physician if symptoms of infection or other concerning signs are noted.
Isotretinoin Pregnancy And Lactation Text: This medication is Pregnancy Category X and is considered extremely dangerous during pregnancy. It is unknown if it is excreted in breast milk.
Cosentyx Counseling:  I discussed with the patient the risks of Cosentyx including but not limited to worsening of Crohn's disease, immunosuppression, allergic reactions and infections.  The patient understands that monitoring is required including a PPD at baseline and must alert us or the primary physician if symptoms of infection or other concerning signs are noted.
Quinolones Counseling:  I discussed with the patient the risks of fluoroquinolones including but not limited to GI upset, allergic reaction, drug rash, diarrhea, dizziness, photosensitivity, yeast infections, liver function test abnormalities, tendonitis/tendon rupture.
Doxycycline Counseling:  Patient counseled regarding possible photosensitivity and increased risk for sunburn.  Patient instructed to avoid sunlight, if possible.  When exposed to sunlight, patients should wear protective clothing, sunglasses, and sunscreen.  The patient was instructed to call the office immediately if the following severe adverse effects occur:  hearing changes, easy bruising/bleeding, severe headache, or vision changes.  The patient verbalized understanding of the proper use and possible adverse effects of doxycycline.  All of the patient's questions and concerns were addressed.
Drysol Pregnancy And Lactation Text: This medication is considered safe during pregnancy and breast feeding.
Albendazole Counseling:  I discussed with the patient the risks of albendazole including but not limited to cytopenia, kidney damage, nausea/vomiting and severe allergy.  The patient understands that this medication is being used in an off-label manner.
Rifampin Counseling: I discussed with the patient the risks of rifampin including but not limited to liver damage, kidney damage, red-orange body fluids, nausea/vomiting and severe allergy.
Taltz Counseling: I discussed with the patient the risks of ixekizumab including but not limited to immunosuppression, serious infections, worsening of inflammatory bowel disease and drug reactions.  The patient understands that monitoring is required including a PPD at baseline and must alert us or the primary physician if symptoms of infection or other concerning signs are noted.
High Dose Vitamin A Pregnancy And Lactation Text: High dose vitamin A therapy is contraindicated during pregnancy and breast feeding.
Terbinafine Counseling: Patient counseling regarding adverse effects of terbinafine including but not limited to headache, diarrhea, rash, upset stomach, liver function test abnormalities, itching, taste/smell disturbance, nausea, abdominal pain, and flatulence.  There is a rare possibility of liver failure that can occur when taking terbinafine.  The patient understands that a baseline LFT and kidney function test may be required. The patient verbalized understanding of the proper use and possible adverse effects of terbinafine.  All of the patient's questions and concerns were addressed.
Minoxidil Counseling: Minoxidil is a topical medication which can increase blood flow where it is applied. It is uncertain how this medication increases hair growth. Side effects are uncommon and include stinging and allergic reactions.
Otezla Counseling: The side effects of Otezla were discussed with the patient, including but not limited to worsening or new depression, weight loss, diarrhea, nausea, upper respiratory tract infection, and headache. Patient instructed to call the office should any adverse effect occur.  The patient verbalized understanding of the proper use and possible adverse effects of Otezla.  All the patient's questions and concerns were addressed.
Azathioprine Counseling:  I discussed with the patient the risks of azathioprine including but not limited to myelosuppression, immunosuppression, hepatotoxicity, lymphoma, and infections.  The patient understands that monitoring is required including baseline LFTs, Creatinine, possible TPMP genotyping and weekly CBCs for the first month and then every 2 weeks thereafter.  The patient verbalized understanding of the proper use and possible adverse effects of azathioprine.  All of the patient's questions and concerns were addressed.
Tazorac Counseling:  Patient advised that medication is irritating and drying.  Patient may need to apply sparingly and wash off after an hour before eventually leaving it on overnight.  The patient verbalized understanding of the proper use and possible adverse effects of tazorac.  All of the patient's questions and concerns were addressed.
Erivedge Counseling- I discussed with the patient the risks of Erivedge including but not limited to nausea, vomiting, diarrhea, constipation, weight loss, changes in the sense of taste, decreased appetite, muscle spasms, and hair loss.  The patient verbalized understanding of the proper use and possible adverse effects of Erivedge.  All of the patient's questions and concerns were addressed.
Thalidomide Counseling: I discussed with the patient the risks of thalidomide including but not limited to birth defects, anxiety, weakness, chest pain, dizziness, cough and severe allergy.
Infliximab Counseling:  I discussed with the patient the risks of infliximab including but not limited to myelosuppression, immunosuppression, autoimmune hepatitis, demyelinating diseases, lymphoma, and serious infections.  The patient understands that monitoring is required including a PPD at baseline and must alert us or the primary physician if symptoms of infection or other concerning signs are noted.
Prednisone Counseling:  I discussed with the patient the risks of prolonged use of prednisone including but not limited to weight gain, insomnia, osteoporosis, mood changes, diabetes, susceptibility to infection, glaucoma and high blood pressure.  In cases where prednisone use is prolonged, patients should be monitored with blood pressure checks, serum glucose levels and an eye exam.  Additionally, the patient may need to be placed on GI prophylaxis, PCP prophylaxis, and calcium and vitamin D supplementation and/or a bisphosphonate.  The patient verbalized understanding of the proper use and the possible adverse effects of prednisone.  All of the patient's questions and concerns were addressed.

## 2019-02-26 NOTE — PROCEDURE: TREATMENT REGIMEN
Plan: Advised to discuss with PCP about Spironolactone. Patient has hx of high blood pressure. Will revisit at next appointment if needed. RTC as needed
Detail Level: Zone
Initiate Treatment: Aczone 5% Qhs

## 2019-02-26 NOTE — PROCEDURE: COUNSELING
Tazorac Pregnancy And Lactation Text: This medication is not safe during pregnancy. It is unknown if this medication is excreted in breast milk.
Benzoyl Peroxide Counseling: Patient counseled that medicine may cause skin irritation and bleach clothing.  In the event of skin irritation, the patient was advised to reduce the amount of the drug applied or use it less frequently.   The patient verbalized understanding of the proper use and possible adverse effects of benzoyl peroxide.  All of the patient's questions and concerns were addressed.
Bactrim Pregnancy And Lactation Text: This medication is Pregnancy Category D and is known to cause fetal risk.  It is also excreted in breast milk.
Minocycline Pregnancy And Lactation Text: This medication is Pregnancy Category D and not consider safe during pregnancy. It is also excreted in breast milk.
Topical Sulfur Applications Pregnancy And Lactation Text: This medication is Pregnancy Category C and has an unknown safety profile during pregnancy. It is unknown if this topical medication is excreted in breast milk.
Isotretinoin Counseling: Patient should get monthly blood tests, not donate blood, not drive at night if vision affected, not share medication, and not undergo elective surgery for 6 months after tx completed. Side effects reviewed, pt to contact office should one occur.
Spironolactone Counseling: Patient advised regarding risks of diarrhea, abdominal pain, hyperkalemia, birth defects (for female patients), liver toxicity and renal toxicity. The patient may need blood work to monitor liver and kidney function and potassium levels while on therapy. The patient verbalized understanding of the proper use and possible adverse effects of spironolactone.  All of the patient's questions and concerns were addressed.
Isotretinoin Pregnancy And Lactation Text: This medication is Pregnancy Category X and is considered extremely dangerous during pregnancy. It is unknown if it is excreted in breast milk.
Doxycycline Counseling:  Patient counseled regarding possible photosensitivity and increased risk for sunburn.  Patient instructed to avoid sunlight, if possible.  When exposed to sunlight, patients should wear protective clothing, sunglasses, and sunscreen.  The patient was instructed to call the office immediately if the following severe adverse effects occur:  hearing changes, easy bruising/bleeding, severe headache, or vision changes.  The patient verbalized understanding of the proper use and possible adverse effects of doxycycline.  All of the patient's questions and concerns were addressed.
Include Pregnancy/Lactation Warning?: No
Topical Clindamycin Counseling: Patient counseled that this medication may cause skin irritation or allergic reactions.  In the event of skin irritation, the patient was advised to reduce the amount of the drug applied or use it less frequently.   The patient verbalized understanding of the proper use and possible adverse effects of clindamycin.  All of the patient's questions and concerns were addressed.
Benzoyl Peroxide Pregnancy And Lactation Text: This medication is Pregnancy Category C. It is unknown if benzoyl peroxide is excreted in breast milk.
Birth Control Pills Counseling: Birth Control Pill Counseling: I discussed with the patient the potential side effects of OCPs including but not limited to increased risk of stroke, heart attack, thrombophlebitis, deep venous thrombosis, hepatic adenomas, breast changes, GI upset, headaches, and depression.  The patient verbalized understanding of the proper use and possible adverse effects of OCPs. All of the patient's questions and concerns were addressed.
Topical Retinoid counseling:  Patient advised to apply a pea-sized amount only at bedtime and wait 30 minutes after washing their face before applying.  If too drying, patient may add a non-comedogenic moisturizer. The patient verbalized understanding of the proper use and possible adverse effects of retinoids.  All of the patient's questions and concerns were addressed.
Birth Control Pills Pregnancy And Lactation Text: This medication should be avoided if pregnant and for the first 30 days post-partum.
Spironolactone Pregnancy And Lactation Text: This medication can cause feminization of the male fetus and should be avoided during pregnancy. The active metabolite is also found in breast milk.
Azithromycin Counseling:  I discussed with the patient the risks of azithromycin including but not limited to GI upset, allergic reaction, drug rash, diarrhea, and yeast infections.
High Dose Vitamin A Counseling: Side effects reviewed, pt to contact office should one occur.
Doxycycline Pregnancy And Lactation Text: This medication is Pregnancy Category D and not consider safe during pregnancy. It is also excreted in breast milk but is considered safe for shorter treatment courses.
Erythromycin Counseling:  I discussed with the patient the risks of erythromycin including but not limited to GI upset, allergic reaction, drug rash, diarrhea, increase in liver enzymes, and yeast infections.
Topical Clindamycin Pregnancy And Lactation Text: This medication is Pregnancy Category B and is considered safe during pregnancy. It is unknown if it is excreted in breast milk.
Topical Sulfur Applications Counseling: Topical Sulfur Counseling: Patient counseled that this medication may cause skin irritation or allergic reactions.  In the event of skin irritation, the patient was advised to reduce the amount of the drug applied or use it less frequently.   The patient verbalized understanding of the proper use and possible adverse effects of topical sulfur application.  All of the patient's questions and concerns were addressed.
Topical Retinoid Pregnancy And Lactation Text: This medication is Pregnancy Category C. It is unknown if this medication is excreted in breast milk.
Tetracycline Counseling: Patient counseled regarding possible photosensitivity and increased risk for sunburn.  Patient instructed to avoid sunlight, if possible.  When exposed to sunlight, patients should wear protective clothing, sunglasses, and sunscreen.  The patient was instructed to call the office immediately if the following severe adverse effects occur:  hearing changes, easy bruising/bleeding, severe headache, or vision changes.  The patient verbalized understanding of the proper use and possible adverse effects of tetracycline.  All of the patient's questions and concerns were addressed. Patient understands to avoid pregnancy while on therapy due to potential birth defects.
Dapsone Counseling: I discussed with the patient the risks of dapsone including but not limited to hemolytic anemia, agranulocytosis, rashes, methemoglobinemia, kidney failure, peripheral neuropathy, headaches, GI upset, and liver toxicity.  Patients who start dapsone require monitoring including baseline LFTs and weekly CBCs for the first month, then every month thereafter.  The patient verbalized understanding of the proper use and possible adverse effects of dapsone.  All of the patient's questions and concerns were addressed.
Detail Level: Zone
Azithromycin Pregnancy And Lactation Text: This medication is considered safe during pregnancy and is also secreted in breast milk.
High Dose Vitamin A Pregnancy And Lactation Text: High dose vitamin A therapy is contraindicated during pregnancy and breast feeding.
Minocycline Counseling: Patient advised regarding possible photosensitivity and discoloration of the teeth, skin, lips, tongue and gums.  Patient instructed to avoid sunlight, if possible.  When exposed to sunlight, patients should wear protective clothing, sunglasses, and sunscreen.  The patient was instructed to call the office immediately if the following severe adverse effects occur:  hearing changes, easy bruising/bleeding, severe headache, or vision changes.  The patient verbalized understanding of the proper use and possible adverse effects of minocycline.  All of the patient's questions and concerns were addressed.
Bactrim Counseling:  I discussed with the patient the risks of sulfa antibiotics including but not limited to GI upset, allergic reaction, drug rash, diarrhea, dizziness, photosensitivity, and yeast infections.  Rarely, more serious reactions can occur including but not limited to aplastic anemia, agranulocytosis, methemoglobinemia, blood dyscrasias, liver or kidney failure, lung infiltrates or desquamative/blistering drug rashes.
Erythromycin Pregnancy And Lactation Text: This medication is Pregnancy Category B and is considered safe during pregnancy. It is also excreted in breast milk.
Dapsone Pregnancy And Lactation Text: This medication is Pregnancy Category C and is not considered safe during pregnancy or breast feeding.
Tazorac Counseling:  Patient advised that medication is irritating and drying.  Patient may need to apply sparingly and wash off after an hour before eventually leaving it on overnight.  The patient verbalized understanding of the proper use and possible adverse effects of tazorac.  All of the patient's questions and concerns were addressed.

## 2019-06-27 ENCOUNTER — APPOINTMENT (RX ONLY)
Dept: URBAN - METROPOLITAN AREA CLINIC 45 | Facility: CLINIC | Age: 59
Setting detail: DERMATOLOGY
End: 2019-06-27

## 2019-06-27 ENCOUNTER — APPOINTMENT (RX ONLY)
Dept: URBAN - METROPOLITAN AREA CLINIC 44 | Facility: CLINIC | Age: 59
Setting detail: DERMATOLOGY
End: 2019-06-27

## 2019-06-27 DIAGNOSIS — Z41.9 ENCOUNTER FOR PROCEDURE FOR PURPOSES OTHER THAN REMEDYING HEALTH STATE, UNSPECIFIED: ICD-10-CM

## 2019-06-27 DIAGNOSIS — L70.0 ACNE VULGARIS: ICD-10-CM

## 2019-06-27 PROCEDURE — ? COUNSELING

## 2019-06-27 PROCEDURE — ? TREATMENT REGIMEN

## 2019-06-27 PROCEDURE — 99212 OFFICE O/P EST SF 10 MIN: CPT

## 2019-06-27 PROCEDURE — ? DYSPORT (U OR CC)

## 2019-06-27 ASSESSMENT — LOCATION SIMPLE DESCRIPTION DERM
LOCATION SIMPLE: LEFT EYELID
LOCATION SIMPLE: RIGHT EYEBROW
LOCATION SIMPLE: GLABELLA
LOCATION SIMPLE: RIGHT CHEEK
LOCATION SIMPLE: LEFT EYELID
LOCATION SIMPLE: RIGHT TEMPLE
LOCATION SIMPLE: RIGHT CHEEK
LOCATION SIMPLE: RIGHT EYEBROW
LOCATION SIMPLE: GLABELLA
LOCATION SIMPLE: RIGHT TEMPLE

## 2019-06-27 ASSESSMENT — LOCATION ZONE DERM
LOCATION ZONE: FACE
LOCATION ZONE: EYELID
LOCATION ZONE: FACE
LOCATION ZONE: EYELID

## 2019-06-27 ASSESSMENT — LOCATION DETAILED DESCRIPTION DERM
LOCATION DETAILED: GLABELLA
LOCATION DETAILED: RIGHT INFERIOR CENTRAL MALAR CHEEK
LOCATION DETAILED: RIGHT MID TEMPLE
LOCATION DETAILED: RIGHT MID TEMPLE
LOCATION DETAILED: RIGHT MEDIAL EYEBROW
LOCATION DETAILED: RIGHT INFERIOR CENTRAL MALAR CHEEK
LOCATION DETAILED: LEFT LATERAL CANTHUS
LOCATION DETAILED: GLABELLA
LOCATION DETAILED: LEFT LATERAL CANTHUS
LOCATION DETAILED: RIGHT MEDIAL EYEBROW

## 2019-06-27 NOTE — PROCEDURE: DYSPORT (U OR CC)
Additional Area 3 Units: 0
Consent: Written consent obtained. Risks include but not limited to lid/brow ptosis, bruising, swelling, diplopia, temporary effect, incomplete chemical denervation.
Post-Care Instructions: Patient instructed to use the muscles injected for the next hour.  Do not massage the area or lie down for 4 hours and no vigorous physical activity for 24 hours.
Price (Use Numbers Only, No Special Characters Or $): 262.22
Detail Level: Detailed
Dilution (U/0.1 Cc): 30
Glabellar Complex Units: 45
Quantity Per Injection Site (Units Or Cc): 15 U

## 2019-06-27 NOTE — PROCEDURE: TREATMENT REGIMEN
Continue Regimen: Aczone 5% Qhs
Plan: Patient will start using both Differin and Aczone nightly. Discuss with patient about medications she is using to controlled high blood pressure. Encourage patient to drink plenty of water.
Otc Regimen: Recommended patient to use OTC Differin (Retinoid) nightly
Detail Level: Zone

## 2019-06-27 NOTE — HPI: COSMETIC (DYSPORT)
previous_has_had_dysport
When Was Your Last Dysport Treatment?: 02/26/2019
Additional History: Patient is here for Dysport treatment.

## 2019-06-27 NOTE — HPI: SKIN LESIONS
How Severe Is Your Skin Lesion?: mild
Have Your Skin Lesions Been Treated?: not been treated
Is This A New Presentation, Or A Follow-Up?: Skin Lesions
Additional History: Patient also would like to know if she should treat small bumps under both eyelids. Recommended patient to see consultation with the eye doctor.

## 2019-06-27 NOTE — PROCEDURE: DYSPORT (U OR CC)
Post-Care Instructions: Patient instructed to use the muscles injected for the next hour.  Do not massage the area or lie down for 4 hours and no vigorous physical activity for 24 hours.
Additional Area 5 Units: 0
Detail Level: Detailed
Glabellar Complex Units: 45
Quantity Per Injection Site (Units Or Cc): 15 U
Price (Use Numbers Only, No Special Characters Or $): 262.90
Dilution (U/0.1 Cc): 30
Consent: Written consent obtained. Risks include but not limited to lid/brow ptosis, bruising, swelling, diplopia, temporary effect, incomplete chemical denervation.

## 2019-06-27 NOTE — PROCEDURE: COUNSELING
Isotretinoin Pregnancy And Lactation Text: This medication is Pregnancy Category X and is considered extremely dangerous during pregnancy. It is unknown if it is excreted in breast milk.
Isotretinoin Counseling: Patient should get monthly blood tests, not donate blood, not drive at night if vision affected, not share medication, and not undergo elective surgery for 6 months after tx completed. Side effects reviewed, pt to contact office should one occur.
Doxycycline Pregnancy And Lactation Text: This medication is Pregnancy Category D and not consider safe during pregnancy. It is also excreted in breast milk but is considered safe for shorter treatment courses.
Tetracycline Counseling: Patient counseled regarding possible photosensitivity and increased risk for sunburn.  Patient instructed to avoid sunlight, if possible.  When exposed to sunlight, patients should wear protective clothing, sunglasses, and sunscreen.  The patient was instructed to call the office immediately if the following severe adverse effects occur:  hearing changes, easy bruising/bleeding, severe headache, or vision changes.  The patient verbalized understanding of the proper use and possible adverse effects of tetracycline.  All of the patient's questions and concerns were addressed. Patient understands to avoid pregnancy while on therapy due to potential birth defects.
Minocycline Pregnancy And Lactation Text: This medication is Pregnancy Category D and not consider safe during pregnancy. It is also excreted in breast milk.
Detail Level: Zone
Minocycline Counseling: Patient advised regarding possible photosensitivity and discoloration of the teeth, skin, lips, tongue and gums.  Patient instructed to avoid sunlight, if possible.  When exposed to sunlight, patients should wear protective clothing, sunglasses, and sunscreen.  The patient was instructed to call the office immediately if the following severe adverse effects occur:  hearing changes, easy bruising/bleeding, severe headache, or vision changes.  The patient verbalized understanding of the proper use and possible adverse effects of minocycline.  All of the patient's questions and concerns were addressed.
Erythromycin Counseling:  I discussed with the patient the risks of erythromycin including but not limited to GI upset, allergic reaction, drug rash, diarrhea, increase in liver enzymes, and yeast infections.
Bactrim Pregnancy And Lactation Text: This medication is Pregnancy Category D and is known to cause fetal risk.  It is also excreted in breast milk.
Topical Sulfur Applications Counseling: Topical Sulfur Counseling: Patient counseled that this medication may cause skin irritation or allergic reactions.  In the event of skin irritation, the patient was advised to reduce the amount of the drug applied or use it less frequently.   The patient verbalized understanding of the proper use and possible adverse effects of topical sulfur application.  All of the patient's questions and concerns were addressed.
Birth Control Pills Counseling: Birth Control Pill Counseling: I discussed with the patient the potential side effects of OCPs including but not limited to increased risk of stroke, heart attack, thrombophlebitis, deep venous thrombosis, hepatic adenomas, breast changes, GI upset, headaches, and depression.  The patient verbalized understanding of the proper use and possible adverse effects of OCPs. All of the patient's questions and concerns were addressed.
Topical Clindamycin Counseling: Patient counseled that this medication may cause skin irritation or allergic reactions.  In the event of skin irritation, the patient was advised to reduce the amount of the drug applied or use it less frequently.   The patient verbalized understanding of the proper use and possible adverse effects of clindamycin.  All of the patient's questions and concerns were addressed.
Benzoyl Peroxide Counseling: Patient counseled that medicine may cause skin irritation and bleach clothing.  In the event of skin irritation, the patient was advised to reduce the amount of the drug applied or use it less frequently.   The patient verbalized understanding of the proper use and possible adverse effects of benzoyl peroxide.  All of the patient's questions and concerns were addressed.
High Dose Vitamin A Pregnancy And Lactation Text: High dose vitamin A therapy is contraindicated during pregnancy and breast feeding.
Topical Retinoid counseling:  Patient advised to apply a pea-sized amount only at bedtime and wait 30 minutes after washing their face before applying.  If too drying, patient may add a non-comedogenic moisturizer. The patient verbalized understanding of the proper use and possible adverse effects of retinoids.  All of the patient's questions and concerns were addressed.
Dapsone Pregnancy And Lactation Text: This medication is Pregnancy Category C and is not considered safe during pregnancy or breast feeding.
Erythromycin Pregnancy And Lactation Text: This medication is Pregnancy Category B and is considered safe during pregnancy. It is also excreted in breast milk.
Azithromycin Pregnancy And Lactation Text: This medication is considered safe during pregnancy and is also secreted in breast milk.
Tazorac Counseling:  Patient advised that medication is irritating and drying.  Patient may need to apply sparingly and wash off after an hour before eventually leaving it on overnight.  The patient verbalized understanding of the proper use and possible adverse effects of tazorac.  All of the patient's questions and concerns were addressed.
Spironolactone Pregnancy And Lactation Text: This medication can cause feminization of the male fetus and should be avoided during pregnancy. The active metabolite is also found in breast milk.
Benzoyl Peroxide Pregnancy And Lactation Text: This medication is Pregnancy Category C. It is unknown if benzoyl peroxide is excreted in breast milk.
Dapsone Counseling: I discussed with the patient the risks of dapsone including but not limited to hemolytic anemia, agranulocytosis, rashes, methemoglobinemia, kidney failure, peripheral neuropathy, headaches, GI upset, and liver toxicity.  Patients who start dapsone require monitoring including baseline LFTs and weekly CBCs for the first month, then every month thereafter.  The patient verbalized understanding of the proper use and possible adverse effects of dapsone.  All of the patient's questions and concerns were addressed.
Tazorac Pregnancy And Lactation Text: This medication is not safe during pregnancy. It is unknown if this medication is excreted in breast milk.
Use Enhanced Medication Counseling?: No
Bactrim Counseling:  I discussed with the patient the risks of sulfa antibiotics including but not limited to GI upset, allergic reaction, drug rash, diarrhea, dizziness, photosensitivity, and yeast infections.  Rarely, more serious reactions can occur including but not limited to aplastic anemia, agranulocytosis, methemoglobinemia, blood dyscrasias, liver or kidney failure, lung infiltrates or desquamative/blistering drug rashes.
High Dose Vitamin A Counseling: Side effects reviewed, pt to contact office should one occur.
Azithromycin Counseling:  I discussed with the patient the risks of azithromycin including but not limited to GI upset, allergic reaction, drug rash, diarrhea, and yeast infections.
Spironolactone Counseling: Patient advised regarding risks of diarrhea, abdominal pain, hyperkalemia, birth defects (for female patients), liver toxicity and renal toxicity. The patient may need blood work to monitor liver and kidney function and potassium levels while on therapy. The patient verbalized understanding of the proper use and possible adverse effects of spironolactone.  All of the patient's questions and concerns were addressed.
Birth Control Pills Pregnancy And Lactation Text: This medication should be avoided if pregnant and for the first 30 days post-partum.
Topical Clindamycin Pregnancy And Lactation Text: This medication is Pregnancy Category B and is considered safe during pregnancy. It is unknown if it is excreted in breast milk.
Doxycycline Counseling:  Patient counseled regarding possible photosensitivity and increased risk for sunburn.  Patient instructed to avoid sunlight, if possible.  When exposed to sunlight, patients should wear protective clothing, sunglasses, and sunscreen.  The patient was instructed to call the office immediately if the following severe adverse effects occur:  hearing changes, easy bruising/bleeding, severe headache, or vision changes.  The patient verbalized understanding of the proper use and possible adverse effects of doxycycline.  All of the patient's questions and concerns were addressed.
Topical Sulfur Applications Pregnancy And Lactation Text: This medication is Pregnancy Category C and has an unknown safety profile during pregnancy. It is unknown if this topical medication is excreted in breast milk.
Topical Retinoid Pregnancy And Lactation Text: This medication is Pregnancy Category C. It is unknown if this medication is excreted in breast milk.

## 2019-06-27 NOTE — HPI: SKIN LESIONS
Have Your Skin Lesions Been Treated?: not been treated
Is This A New Presentation, Or A Follow-Up?: Skin Lesions
How Severe Is Your Skin Lesion?: mild
Additional History: Patient also would like to know if she should treat small bumps under both eyelids. Recommended patient to see consultation with the eye doctor.

## 2019-06-27 NOTE — HPI: COSMETIC (DYSPORT)
previous_has_had_dysport
Additional History: Patient is here for Dysport treatment.
When Was Your Last Dysport Treatment?: 02/26/2019

## 2019-06-27 NOTE — PROCEDURE: TREATMENT REGIMEN
Otc Regimen: Recommended patient to use OTC Differin (Retinoid) nightly
Plan: Patient will start using both Differin and Aczone nightly. Discuss with patient about medications she is using to controlled high blood pressure. Encourage patient to drink plenty of water.
Detail Level: Zone
Continue Regimen: Aczone 5% Qhs

## 2019-06-27 NOTE — PROCEDURE: COUNSELING
Bactrim Pregnancy And Lactation Text: This medication is Pregnancy Category D and is known to cause fetal risk.  It is also excreted in breast milk.
Bactrim Counseling:  I discussed with the patient the risks of sulfa antibiotics including but not limited to GI upset, allergic reaction, drug rash, diarrhea, dizziness, photosensitivity, and yeast infections.  Rarely, more serious reactions can occur including but not limited to aplastic anemia, agranulocytosis, methemoglobinemia, blood dyscrasias, liver or kidney failure, lung infiltrates or desquamative/blistering drug rashes.
Doxycycline Counseling:  Patient counseled regarding possible photosensitivity and increased risk for sunburn.  Patient instructed to avoid sunlight, if possible.  When exposed to sunlight, patients should wear protective clothing, sunglasses, and sunscreen.  The patient was instructed to call the office immediately if the following severe adverse effects occur:  hearing changes, easy bruising/bleeding, severe headache, or vision changes.  The patient verbalized understanding of the proper use and possible adverse effects of doxycycline.  All of the patient's questions and concerns were addressed.
Detail Level: Zone
Minocycline Pregnancy And Lactation Text: This medication is Pregnancy Category D and not consider safe during pregnancy. It is also excreted in breast milk.
Topical Clindamycin Counseling: Patient counseled that this medication may cause skin irritation or allergic reactions.  In the event of skin irritation, the patient was advised to reduce the amount of the drug applied or use it less frequently.   The patient verbalized understanding of the proper use and possible adverse effects of clindamycin.  All of the patient's questions and concerns were addressed.
Spironolactone Counseling: Patient advised regarding risks of diarrhea, abdominal pain, hyperkalemia, birth defects (for female patients), liver toxicity and renal toxicity. The patient may need blood work to monitor liver and kidney function and potassium levels while on therapy. The patient verbalized understanding of the proper use and possible adverse effects of spironolactone.  All of the patient's questions and concerns were addressed.
Isotretinoin Counseling: Patient should get monthly blood tests, not donate blood, not drive at night if vision affected, not share medication, and not undergo elective surgery for 6 months after tx completed. Side effects reviewed, pt to contact office should one occur.
Topical Retinoid counseling:  Patient advised to apply a pea-sized amount only at bedtime and wait 30 minutes after washing their face before applying.  If too drying, patient may add a non-comedogenic moisturizer. The patient verbalized understanding of the proper use and possible adverse effects of retinoids.  All of the patient's questions and concerns were addressed.
Use Enhanced Medication Counseling?: No
High Dose Vitamin A Counseling: Side effects reviewed, pt to contact office should one occur.
Topical Clindamycin Pregnancy And Lactation Text: This medication is Pregnancy Category B and is considered safe during pregnancy. It is unknown if it is excreted in breast milk.
Tetracycline Counseling: Patient counseled regarding possible photosensitivity and increased risk for sunburn.  Patient instructed to avoid sunlight, if possible.  When exposed to sunlight, patients should wear protective clothing, sunglasses, and sunscreen.  The patient was instructed to call the office immediately if the following severe adverse effects occur:  hearing changes, easy bruising/bleeding, severe headache, or vision changes.  The patient verbalized understanding of the proper use and possible adverse effects of tetracycline.  All of the patient's questions and concerns were addressed. Patient understands to avoid pregnancy while on therapy due to potential birth defects.
Isotretinoin Pregnancy And Lactation Text: This medication is Pregnancy Category X and is considered extremely dangerous during pregnancy. It is unknown if it is excreted in breast milk.
Dapsone Pregnancy And Lactation Text: This medication is Pregnancy Category C and is not considered safe during pregnancy or breast feeding.
Benzoyl Peroxide Pregnancy And Lactation Text: This medication is Pregnancy Category C. It is unknown if benzoyl peroxide is excreted in breast milk.
Birth Control Pills Pregnancy And Lactation Text: This medication should be avoided if pregnant and for the first 30 days post-partum.
Doxycycline Pregnancy And Lactation Text: This medication is Pregnancy Category D and not consider safe during pregnancy. It is also excreted in breast milk but is considered safe for shorter treatment courses.
Topical Sulfur Applications Counseling: Topical Sulfur Counseling: Patient counseled that this medication may cause skin irritation or allergic reactions.  In the event of skin irritation, the patient was advised to reduce the amount of the drug applied or use it less frequently.   The patient verbalized understanding of the proper use and possible adverse effects of topical sulfur application.  All of the patient's questions and concerns were addressed.
Azithromycin Pregnancy And Lactation Text: This medication is considered safe during pregnancy and is also secreted in breast milk.
Topical Sulfur Applications Pregnancy And Lactation Text: This medication is Pregnancy Category C and has an unknown safety profile during pregnancy. It is unknown if this topical medication is excreted in breast milk.
Minocycline Counseling: Patient advised regarding possible photosensitivity and discoloration of the teeth, skin, lips, tongue and gums.  Patient instructed to avoid sunlight, if possible.  When exposed to sunlight, patients should wear protective clothing, sunglasses, and sunscreen.  The patient was instructed to call the office immediately if the following severe adverse effects occur:  hearing changes, easy bruising/bleeding, severe headache, or vision changes.  The patient verbalized understanding of the proper use and possible adverse effects of minocycline.  All of the patient's questions and concerns were addressed.
Benzoyl Peroxide Counseling: Patient counseled that medicine may cause skin irritation and bleach clothing.  In the event of skin irritation, the patient was advised to reduce the amount of the drug applied or use it less frequently.   The patient verbalized understanding of the proper use and possible adverse effects of benzoyl peroxide.  All of the patient's questions and concerns were addressed.
Dapsone Counseling: I discussed with the patient the risks of dapsone including but not limited to hemolytic anemia, agranulocytosis, rashes, methemoglobinemia, kidney failure, peripheral neuropathy, headaches, GI upset, and liver toxicity.  Patients who start dapsone require monitoring including baseline LFTs and weekly CBCs for the first month, then every month thereafter.  The patient verbalized understanding of the proper use and possible adverse effects of dapsone.  All of the patient's questions and concerns were addressed.
Erythromycin Pregnancy And Lactation Text: This medication is Pregnancy Category B and is considered safe during pregnancy. It is also excreted in breast milk.
Birth Control Pills Counseling: Birth Control Pill Counseling: I discussed with the patient the potential side effects of OCPs including but not limited to increased risk of stroke, heart attack, thrombophlebitis, deep venous thrombosis, hepatic adenomas, breast changes, GI upset, headaches, and depression.  The patient verbalized understanding of the proper use and possible adverse effects of OCPs. All of the patient's questions and concerns were addressed.
Spironolactone Pregnancy And Lactation Text: This medication can cause feminization of the male fetus and should be avoided during pregnancy. The active metabolite is also found in breast milk.
Azithromycin Counseling:  I discussed with the patient the risks of azithromycin including but not limited to GI upset, allergic reaction, drug rash, diarrhea, and yeast infections.
Erythromycin Counseling:  I discussed with the patient the risks of erythromycin including but not limited to GI upset, allergic reaction, drug rash, diarrhea, increase in liver enzymes, and yeast infections.
High Dose Vitamin A Pregnancy And Lactation Text: High dose vitamin A therapy is contraindicated during pregnancy and breast feeding.
Tazorac Pregnancy And Lactation Text: This medication is not safe during pregnancy. It is unknown if this medication is excreted in breast milk.
Tazorac Counseling:  Patient advised that medication is irritating and drying.  Patient may need to apply sparingly and wash off after an hour before eventually leaving it on overnight.  The patient verbalized understanding of the proper use and possible adverse effects of tazorac.  All of the patient's questions and concerns were addressed.
Topical Retinoid Pregnancy And Lactation Text: This medication is Pregnancy Category C. It is unknown if this medication is excreted in breast milk.

## 2019-11-26 ENCOUNTER — APPOINTMENT (RX ONLY)
Dept: URBAN - METROPOLITAN AREA CLINIC 44 | Facility: CLINIC | Age: 59
Setting detail: DERMATOLOGY
End: 2019-11-26

## 2019-11-26 ENCOUNTER — APPOINTMENT (RX ONLY)
Dept: URBAN - METROPOLITAN AREA CLINIC 45 | Facility: CLINIC | Age: 59
Setting detail: DERMATOLOGY
End: 2019-11-26

## 2019-11-26 DIAGNOSIS — Z41.9 ENCOUNTER FOR PROCEDURE FOR PURPOSES OTHER THAN REMEDYING HEALTH STATE, UNSPECIFIED: ICD-10-CM

## 2019-11-26 PROCEDURE — ? DYSPORT (U OR CC)

## 2019-11-26 ASSESSMENT — LOCATION ZONE DERM
LOCATION ZONE: FACE
LOCATION ZONE: FACE

## 2019-11-26 ASSESSMENT — LOCATION SIMPLE DESCRIPTION DERM
LOCATION SIMPLE: INFERIOR FOREHEAD
LOCATION SIMPLE: INFERIOR FOREHEAD

## 2019-11-26 ASSESSMENT — LOCATION DETAILED DESCRIPTION DERM
LOCATION DETAILED: INFERIOR MID FOREHEAD
LOCATION DETAILED: INFERIOR MID FOREHEAD

## 2019-11-26 NOTE — PROCEDURE: DYSPORT (U OR CC)
Glabellar Complex Units: 50
Additional Area 5 Units: 0
Price (Use Numbers Only, No Special Characters Or $): 262.46
Forehead Units: 25
Consent: Written consent obtained. Risks include but not limited to lid/brow ptosis, bruising, swelling, diplopia, temporary effect, incomplete chemical denervation.
Dilution (U/0.1 Cc): 30
Expiration Date (Month Year): 12/31/2019
Lot #: [id number]
Detail Level: Detailed
Post-Care Instructions: Patient instructed to use the muscles injected for the next hour.  Do not massage the area or lie down for 4 hours and no vigorous physical activity for 24 hours.
Quantity Per Injection Site (Units Or Cc): 10 U

## 2019-11-26 NOTE — PROCEDURE: MIPS QUALITY
Quality 431: Preventive Care And Screening: Unhealthy Alcohol Use - Screening: Patient screened for unhealthy alcohol use using a single question and scores less than 2 times per year
Quality 110: Preventive Care And Screening: Influenza Immunization: Influenza Immunization not Administered for Documented Reasons.
Additional Notes: Patient is planning on receiving flu vaccination this season.
Quality 226: Preventive Care And Screening: Tobacco Use: Screening And Cessation Intervention: Patient screened for tobacco use and is an ex/non-smoker
Quality 130: Documentation Of Current Medications In The Medical Record: Current Medications Documented
Detail Level: Detailed

## 2019-11-26 NOTE — HPI: COSMETIC (DYSPORT)
previous_has_had_dysport
Additional History: Pt states she will like a little more than 75 units today.
When Was Your Last Dysport Treatment?: 06/2019

## 2019-11-26 NOTE — PROCEDURE: MIPS QUALITY
Additional Notes: Patient is planning on receiving flu vaccination this season.
Quality 226: Preventive Care And Screening: Tobacco Use: Screening And Cessation Intervention: Patient screened for tobacco use and is an ex/non-smoker
Quality 110: Preventive Care And Screening: Influenza Immunization: Influenza Immunization not Administered for Documented Reasons.
Quality 431: Preventive Care And Screening: Unhealthy Alcohol Use - Screening: Patient screened for unhealthy alcohol use using a single question and scores less than 2 times per year
Quality 130: Documentation Of Current Medications In The Medical Record: Current Medications Documented
Detail Level: Detailed

## 2019-11-26 NOTE — PROCEDURE: DYSPORT (U OR CC)
Depressor Anguli Oris Units: 0
Expiration Date (Month Year): 12/31/2019
Price (Use Numbers Only, No Special Characters Or $): 262.63
Post-Care Instructions: Patient instructed to use the muscles injected for the next hour.  Do not massage the area or lie down for 4 hours and no vigorous physical activity for 24 hours.
Dilution (U/0.1 Cc): 30
Glabellar Complex Units: 50
Forehead Units: 25
Lot #: [id number]
Consent: Written consent obtained. Risks include but not limited to lid/brow ptosis, bruising, swelling, diplopia, temporary effect, incomplete chemical denervation.
Quantity Per Injection Site (Units Or Cc): 10 U
Detail Level: Detailed

## 2019-11-26 NOTE — HPI: COSMETIC (DYSPORT)
previous_has_had_dysport
When Was Your Last Dysport Treatment?: 06/2019
Additional History: Pt states she will like a little more than 75 units today.

## 2020-05-21 ENCOUNTER — APPOINTMENT (RX ONLY)
Dept: URBAN - METROPOLITAN AREA CLINIC 44 | Facility: CLINIC | Age: 60
Setting detail: DERMATOLOGY
End: 2020-05-21

## 2020-05-21 ENCOUNTER — APPOINTMENT (RX ONLY)
Dept: URBAN - METROPOLITAN AREA CLINIC 45 | Facility: CLINIC | Age: 60
Setting detail: DERMATOLOGY
End: 2020-05-21

## 2020-05-21 DIAGNOSIS — Z41.9 ENCOUNTER FOR PROCEDURE FOR PURPOSES OTHER THAN REMEDYING HEALTH STATE, UNSPECIFIED: ICD-10-CM

## 2020-05-21 PROCEDURE — ? DYSPORT

## 2020-05-21 ASSESSMENT — LOCATION SIMPLE DESCRIPTION DERM
LOCATION SIMPLE: LEFT FOREHEAD
LOCATION SIMPLE: GLABELLA
LOCATION SIMPLE: GLABELLA
LOCATION SIMPLE: LEFT FOREHEAD

## 2020-05-21 ASSESSMENT — LOCATION ZONE DERM
LOCATION ZONE: FACE
LOCATION ZONE: FACE

## 2020-05-21 ASSESSMENT — LOCATION DETAILED DESCRIPTION DERM
LOCATION DETAILED: GLABELLA
LOCATION DETAILED: LEFT MEDIAL FOREHEAD
LOCATION DETAILED: LEFT MEDIAL FOREHEAD
LOCATION DETAILED: GLABELLA

## 2020-05-21 NOTE — PROCEDURE: DYSPORT
Additional Area 5 Units: 0
Detail Level: Detailed
Price (Use Numbers Only, No Special Characters Or $): 300
Show Additional Area 3: Yes
Show Right And Left Pupillary Line Units: No
Dilution (U/0.1 Cc): 30
Lot #: [id number]
Post-Care Instructions: Patient instructed to use the muscles injected for the next hour. Do not massage the area or lie down for 4 hours and no vigorous physical activity for 24 hours.
Glabellar Complex Units: 50
Consent: Written consent obtained. Risks include but not limited to lid/brow ptosis, bruising, swelling, diplopia, temporary effect, incomplete chemical denervation.
Forehead Units: 25
Expiration Date (Month Year): 05/31/20

## 2020-05-21 NOTE — HPI: COSMETIC (DYSPORT)
previous_has_had_dysport
Additional History: Pt is here for dysport in glabella and crows feet. Pt wants Dr. Kline to look and tell her if she needs more in those areas. No side effects last time.

## 2020-05-21 NOTE — PROCEDURE: DYSPORT
Left Pupillary Line Units: 0
Show Ucl Units: No
Show Additional Area 6: Yes
Consent: Written consent obtained. Risks include but not limited to lid/brow ptosis, bruising, swelling, diplopia, temporary effect, incomplete chemical denervation.
Post-Care Instructions: Patient instructed to use the muscles injected for the next hour. Do not massage the area or lie down for 4 hours and no vigorous physical activity for 24 hours.
Forehead Units: 25
Expiration Date (Month Year): 05/31/20
Lot #: [id number]
Glabellar Complex Units: 50
Price (Use Numbers Only, No Special Characters Or $): 300
Dilution (U/0.1 Cc): 30
Detail Level: Detailed

## 2020-05-21 NOTE — HPI: COSMETIC (DYSPORT)
previous_has_had_dysport
Additional History: Pt is here for dysport in glabella and crows feet. Pt wants Dr. Chaudhary to look and tell her if she needs more in those areas. No side effects last time.

## 2020-09-17 ENCOUNTER — APPOINTMENT (RX ONLY)
Dept: URBAN - METROPOLITAN AREA CLINIC 45 | Facility: CLINIC | Age: 60
Setting detail: DERMATOLOGY
End: 2020-09-17

## 2020-09-17 ENCOUNTER — APPOINTMENT (RX ONLY)
Dept: URBAN - METROPOLITAN AREA CLINIC 44 | Facility: CLINIC | Age: 60
Setting detail: DERMATOLOGY
End: 2020-09-17

## 2020-09-17 DIAGNOSIS — Z41.9 ENCOUNTER FOR PROCEDURE FOR PURPOSES OTHER THAN REMEDYING HEALTH STATE, UNSPECIFIED: ICD-10-CM

## 2020-09-17 PROCEDURE — ? ADDITIONAL NOTES

## 2020-09-17 PROCEDURE — ? DYSPORT

## 2020-09-17 ASSESSMENT — LOCATION ZONE DERM
LOCATION ZONE: FACE
LOCATION ZONE: FACE

## 2020-09-17 ASSESSMENT — LOCATION DETAILED DESCRIPTION DERM
LOCATION DETAILED: LEFT MEDIAL FOREHEAD
LOCATION DETAILED: GLABELLA
LOCATION DETAILED: LEFT MEDIAL FOREHEAD
LOCATION DETAILED: GLABELLA

## 2020-09-17 ASSESSMENT — LOCATION SIMPLE DESCRIPTION DERM
LOCATION SIMPLE: LEFT FOREHEAD
LOCATION SIMPLE: LEFT FOREHEAD
LOCATION SIMPLE: GLABELLA
LOCATION SIMPLE: GLABELLA

## 2020-09-17 NOTE — PROCEDURE: DYSPORT
Left Pupillary Line Units: 0
Show Ucl Units: No
Show Additional Area 6: Yes
Consent: Written consent obtained. Risks include but not limited to lid/brow ptosis, bruising, swelling, diplopia, temporary effect, incomplete chemical denervation.
Post-Care Instructions: Patient instructed to use the muscles injected for the next hour. Do not massage the area or lie down for 4 hours and no vigorous physical activity for 24 hours.
Forehead Units: 25
Expiration Date (Month Year): 11/30/2020
Lot #: [id number]
Glabellar Complex Units: 50
Price (Use Numbers Only, No Special Characters Or $): 262.63
Dilution (U/0.1 Cc): 30
Detail Level: Detailed

## 2020-09-17 NOTE — PROCEDURE: DYSPORT
Show Additional Area 5: Yes
Left Pupillary Line Units: 0
Dilution (U/0.1 Cc): 30
Show Right And Left Brow Units: No
Post-Care Instructions: Patient instructed to use the muscles injected for the next hour. Do not massage the area or lie down for 4 hours and no vigorous physical activity for 24 hours.
Lot #: [id number]
Forehead Units: 25
Price (Use Numbers Only, No Special Characters Or $): 262.34
Glabellar Complex Units: 50
Consent: Written consent obtained. Risks include but not limited to lid/brow ptosis, bruising, swelling, diplopia, temporary effect, incomplete chemical denervation.
Expiration Date (Month Year): 11/30/2020
Detail Level: Detailed

## 2020-09-17 NOTE — PROCEDURE: ADDITIONAL NOTES
Detail Level: Simple
Additional Notes: Credit of $37.50 was given to patient today due to over charge last visit.

## 2020-09-17 NOTE — PROCEDURE: ADDITIONAL NOTES
Additional Notes: Credit of $37.50 was given to patient today due to over charge last visit.
Detail Level: Simple

## 2021-01-12 ENCOUNTER — APPOINTMENT (RX ONLY)
Dept: URBAN - METROPOLITAN AREA CLINIC 44 | Facility: CLINIC | Age: 61
Setting detail: DERMATOLOGY
End: 2021-01-12

## 2021-01-12 ENCOUNTER — APPOINTMENT (RX ONLY)
Dept: URBAN - METROPOLITAN AREA CLINIC 45 | Facility: CLINIC | Age: 61
Setting detail: DERMATOLOGY
End: 2021-01-12

## 2021-01-12 DIAGNOSIS — Z41.9 ENCOUNTER FOR PROCEDURE FOR PURPOSES OTHER THAN REMEDYING HEALTH STATE, UNSPECIFIED: ICD-10-CM

## 2021-01-12 PROCEDURE — ? DYSPORT

## 2021-01-12 NOTE — HPI: COSMETIC (DYSPORT)
previous_has_had_dysport
Additional History: Patient states she would like around the same units at last visit. Her forehead was a little heavier.
When Was Your Last Dysport Treatment?: 09/2020

## 2021-01-12 NOTE — HPI: COSMETIC (DYSPORT)
previous_has_had_dysport
When Was Your Last Dysport Treatment?: 09/2020
Additional History: Patient states she would like around the same units at last visit. Her forehead was a little heavier.

## 2021-01-12 NOTE — PROCEDURE: DYSPORT
Additional Area 6 Units: 0
Show Nasal Units: Yes
Detail Level: Detailed
Show Lcl Units: No
Price (Use Numbers Only, No Special Characters Or $): 262.63
Dilution (U/0.1 Cc): 30
Additional Comments: Patient had 75 units scattered on glabella, crows feet and forehead today
Additional Area 1 Location: Glabella, crows feet, forehead
Consent: Written consent obtained. Risks include but not limited to lid/brow ptosis, bruising, swelling, diplopia, temporary effect, incomplete chemical denervation.
Post-Care Instructions: Patient instructed to use the muscles injected for the next hour. Do not massage the area or lie down for 4 hours and no vigorous physical activity for 24 hours.
Additional Area 1 Units: 75
Expiration Date (Month Year): 02/28/2021

## 2021-01-12 NOTE — PROCEDURE: DYSPORT
Anterior Platysmal Bands Units: 0
Show Forehead Units: Yes
Expiration Date (Month Year): 02/28/2021
Additional Area 1 Units: 75
Post-Care Instructions: Patient instructed to use the muscles injected for the next hour. Do not massage the area or lie down for 4 hours and no vigorous physical activity for 24 hours.
Show Right And Left Pupillary Line Units: No
Dilution (U/0.1 Cc): 30
Detail Level: Detailed
Price (Use Numbers Only, No Special Characters Or $): 262.44
Additional Area 1 Location: Glabella, crows feet, forehead
Additional Comments: Patient had 75 units scattered on glabella, crows feet and forehead today
Consent: Written consent obtained. Risks include but not limited to lid/brow ptosis, bruising, swelling, diplopia, temporary effect, incomplete chemical denervation.

## 2021-05-13 ENCOUNTER — APPOINTMENT (RX ONLY)
Dept: URBAN - METROPOLITAN AREA CLINIC 44 | Facility: CLINIC | Age: 61
Setting detail: DERMATOLOGY
End: 2021-05-13

## 2021-05-13 ENCOUNTER — APPOINTMENT (RX ONLY)
Dept: URBAN - METROPOLITAN AREA CLINIC 45 | Facility: CLINIC | Age: 61
Setting detail: DERMATOLOGY
End: 2021-05-13

## 2021-05-13 DIAGNOSIS — Z41.9 ENCOUNTER FOR PROCEDURE FOR PURPOSES OTHER THAN REMEDYING HEALTH STATE, UNSPECIFIED: ICD-10-CM

## 2021-05-13 PROCEDURE — ? DYSPORT

## 2021-05-13 PROCEDURE — ? OTHER

## 2021-05-13 NOTE — PROCEDURE: DYSPORT
Consent: Written consent obtained. Risks include but not limited to lid/brow ptosis, bruising, swelling, diplopia, temporary effect, incomplete chemical denervation.
Show Right And Left Brow Units: No
Depressor Anguli Oris Units: 0
Show Orbicularis Oculi Units: Yes
Price (Use Numbers Only, No Special Characters Or $): 210.00
Expiration Date (Month Year): 09/30/2021
Post-Care Instructions: Patient instructed to use the muscles injected for the next hour. Do not massage the area or lie down for 4 hours and no vigorous physical activity for 24 hours.
Forehead Units: 30
Detail Level: Detailed
Lot #: [id number]
Additional Comments: Patient had 60 units scattered on glabella and forehead today

## 2021-05-13 NOTE — PROCEDURE: OTHER
Render Risk Assessment In Note?: no
Detail Level: Zone
Other (Free Text): Pt scheduled for neck and lower face lift 6/1/2021
Note Text (......Xxx Chief Complaint.): This diagnosis correlates with the

## 2021-05-13 NOTE — PROCEDURE: DYSPORT
Additional Area 6 Units: 0
Show Nasal Units: Yes
Detail Level: Detailed
Show Lcl Units: No
Price (Use Numbers Only, No Special Characters Or $): 210.00
Forehead Units: 30
Lot #: [id number]
Additional Comments: Patient had 60 units scattered on glabella and forehead today
Consent: Written consent obtained. Risks include but not limited to lid/brow ptosis, bruising, swelling, diplopia, temporary effect, incomplete chemical denervation.
Post-Care Instructions: Patient instructed to use the muscles injected for the next hour. Do not massage the area or lie down for 4 hours and no vigorous physical activity for 24 hours.
Expiration Date (Month Year): 09/30/2021

## 2021-09-16 ENCOUNTER — APPOINTMENT (RX ONLY)
Dept: URBAN - METROPOLITAN AREA CLINIC 45 | Facility: CLINIC | Age: 61
Setting detail: DERMATOLOGY
End: 2021-09-16

## 2021-09-16 ENCOUNTER — APPOINTMENT (RX ONLY)
Dept: URBAN - METROPOLITAN AREA CLINIC 44 | Facility: CLINIC | Age: 61
Setting detail: DERMATOLOGY
End: 2021-09-16

## 2021-09-16 DIAGNOSIS — Z41.9 ENCOUNTER FOR PROCEDURE FOR PURPOSES OTHER THAN REMEDYING HEALTH STATE, UNSPECIFIED: ICD-10-CM

## 2021-09-16 PROCEDURE — ? DYSPORT (U OR CC)

## 2021-09-16 NOTE — PROCEDURE: DYSPORT (U OR CC)
Additional Area 5 Units: 0
Forehead Units: 30
Consent: Written consent obtained. Risks include but not limited to lid/brow ptosis, bruising, swelling, diplopia, temporary effect, incomplete chemical denervation.
Detail Level: Detailed
Glabellar Complex Units: 27
Lot #: [id number]
Additional Area 1 Location: Crow’s feet
Expiration Date (Month Year): 01/31/2022
Post-Care Instructions: Patient instructed to use the muscles injected for the next hour.  Do not massage the area or lie down for 4 hours and no vigorous physical activity for 24 hours.
Price (Use Numbers Only, No Special Characters Or $): 304.70

## 2021-09-16 NOTE — HPI: COSMETIC (DYSPORT)
previous_has_had_dysport
When Was Your Last Dysport Treatment?: 05/13/2021
Additional History: Pt is here for dysport on glabella and crows feet. Pt has had dysport and tolerated it well. Pt denies any complications. Pt had neck lift 06/20/21 and does not know if she needs the same amount as last visit

## 2021-09-16 NOTE — PROCEDURE: DYSPORT (U OR CC)
Additional Area 2 Units: 0
Post-Care Instructions: Patient instructed to use the muscles injected for the next hour.  Do not massage the area or lie down for 4 hours and no vigorous physical activity for 24 hours.
Additional Area 1 Location: Crow’s feet
Price (Use Numbers Only, No Special Characters Or $): 304.46
Consent: Written consent obtained. Risks include but not limited to lid/brow ptosis, bruising, swelling, diplopia, temporary effect, incomplete chemical denervation.
Dilution (U/0.1 Cc): 30
Detail Level: Detailed
Expiration Date (Month Year): 01/31/2022
Glabellar Complex Units: 27
Lot #: [id number]

## 2021-09-16 NOTE — HPI: COSMETIC (DYSPORT)
previous_has_had_dysport
Additional History: Pt is here for dysport on glabella and crows feet. Pt has had dysport and tolerated it well. Pt denies any complications. Pt had neck lift 06/20/21 and does not know if she needs the same amount as last visit
When Was Your Last Dysport Treatment?: 05/13/2021

## 2022-01-13 ENCOUNTER — APPOINTMENT (RX ONLY)
Dept: URBAN - METROPOLITAN AREA CLINIC 46 | Facility: CLINIC | Age: 62
Setting detail: DERMATOLOGY
End: 2022-01-13

## 2022-01-13 DIAGNOSIS — Z41.9 ENCOUNTER FOR PROCEDURE FOR PURPOSES OTHER THAN REMEDYING HEALTH STATE, UNSPECIFIED: ICD-10-CM

## 2022-01-13 PROCEDURE — ? DYSPORT

## 2022-01-13 PROCEDURE — ? INVENTORY

## 2022-01-13 ASSESSMENT — LOCATION SIMPLE DESCRIPTION DERM
LOCATION SIMPLE: LEFT FOREHEAD
LOCATION SIMPLE: LEFT TEMPLE
LOCATION SIMPLE: GLABELLA
LOCATION SIMPLE: RIGHT TEMPLE
LOCATION SIMPLE: RIGHT EYEBROW
LOCATION SIMPLE: RIGHT FOREHEAD

## 2022-01-13 ASSESSMENT — LOCATION ZONE DERM: LOCATION ZONE: FACE

## 2022-01-13 ASSESSMENT — LOCATION DETAILED DESCRIPTION DERM
LOCATION DETAILED: LEFT FOREHEAD
LOCATION DETAILED: RIGHT MEDIAL FOREHEAD
LOCATION DETAILED: RIGHT INFERIOR TEMPLE
LOCATION DETAILED: LEFT INFERIOR TEMPLE
LOCATION DETAILED: RIGHT MEDIAL EYEBROW
LOCATION DETAILED: GLABELLA
LOCATION DETAILED: RIGHT FOREHEAD

## 2022-01-13 NOTE — PROCEDURE: DYSPORT
Show Inventory Tab: Hide
Show Glabellar Units: Yes
Lot #: [id number]
Periorbital Skin Units: 30
Anterior Platysmal Bands Units: 0
Show Mentalis Units: No
Detail Level: Detailed
Consent: Written consent obtained. Risks include but not limited to lid/brow ptosis, bruising, swelling, diplopia, temporary effect, incomplete chemical denervation.
Expiration Date (Month Year): 08/31/2022
Post-Care Instructions: Patient instructed to not lie down for 4 hours and limit physical activity for 24 hours.
Glabellar Complex Units: 27

## 2022-01-13 NOTE — HPI: COSMETIC (DYSPORT)
previous_has_had_dysport
Additional History: Pt states she will like dysport in sales areas as before, forehead,crows feet and glabella. Pt states she had a lower jaw lift and neck lift in July. Pt was not happy with results. Dr. Callejas perform a face lift and lipo suction on the chin just last week. Pt wanted to make sure it was okay to get Dysport done. Dr. Callejas stated it will be okay to receive Dysport today.

## 2022-05-16 ENCOUNTER — APPOINTMENT (RX ONLY)
Dept: URBAN - METROPOLITAN AREA CLINIC 33 | Facility: CLINIC | Age: 62
Setting detail: DERMATOLOGY
End: 2022-05-16

## 2022-05-16 DIAGNOSIS — Z41.9 ENCOUNTER FOR PROCEDURE FOR PURPOSES OTHER THAN REMEDYING HEALTH STATE, UNSPECIFIED: ICD-10-CM

## 2022-05-16 PROCEDURE — ? DYSPORT

## 2022-05-16 PROCEDURE — ? MEDICAL CONSULTATION: DYSPORT

## 2022-05-16 PROCEDURE — ? INVENTORY

## 2022-05-16 ASSESSMENT — LOCATION DETAILED DESCRIPTION DERM
LOCATION DETAILED: RIGHT MEDIAL EYEBROW
LOCATION DETAILED: LEFT INFERIOR LATERAL FOREHEAD
LOCATION DETAILED: RIGHT INFERIOR LATERAL FOREHEAD
LOCATION DETAILED: LEFT INFERIOR TEMPLE
LOCATION DETAILED: RIGHT SUPERIOR CENTRAL MALAR CHEEK
LOCATION DETAILED: LEFT MEDIAL FOREHEAD
LOCATION DETAILED: GLABELLA
LOCATION DETAILED: RIGHT LATERAL CANTHUS
LOCATION DETAILED: RIGHT MEDIAL FOREHEAD
LOCATION DETAILED: LEFT SUPERIOR LATERAL MALAR CHEEK
LOCATION DETAILED: LEFT FOREHEAD
LOCATION DETAILED: RIGHT FOREHEAD
LOCATION DETAILED: LEFT MEDIAL EYEBROW

## 2022-05-16 ASSESSMENT — LOCATION ZONE DERM
LOCATION ZONE: FACE
LOCATION ZONE: EYELID

## 2022-05-16 ASSESSMENT — LOCATION SIMPLE DESCRIPTION DERM
LOCATION SIMPLE: LEFT TEMPLE
LOCATION SIMPLE: LEFT CHEEK
LOCATION SIMPLE: RIGHT CHEEK
LOCATION SIMPLE: RIGHT EYEBROW
LOCATION SIMPLE: LEFT FOREHEAD
LOCATION SIMPLE: RIGHT EYELID
LOCATION SIMPLE: LEFT EYEBROW
LOCATION SIMPLE: GLABELLA
LOCATION SIMPLE: RIGHT FOREHEAD

## 2022-08-04 ENCOUNTER — TELEPHONE ENCOUNTER (OUTPATIENT)
Dept: URBAN - METROPOLITAN AREA CLINIC 70 | Facility: CLINIC | Age: 62
End: 2022-08-04

## 2022-08-31 ENCOUNTER — OFFICE VISIT (OUTPATIENT)
Dept: URBAN - METROPOLITAN AREA CLINIC 70 | Facility: CLINIC | Age: 62
End: 2022-08-31
Payer: COMMERCIAL

## 2022-08-31 ENCOUNTER — DASHBOARD ENCOUNTERS (OUTPATIENT)
Age: 62
End: 2022-08-31

## 2022-08-31 ENCOUNTER — WEB ENCOUNTER (OUTPATIENT)
Dept: URBAN - METROPOLITAN AREA CLINIC 70 | Facility: CLINIC | Age: 62
End: 2022-08-31

## 2022-08-31 ENCOUNTER — LAB OUTSIDE AN ENCOUNTER (OUTPATIENT)
Dept: URBAN - METROPOLITAN AREA CLINIC 70 | Facility: CLINIC | Age: 62
End: 2022-08-31

## 2022-08-31 VITALS
HEART RATE: 101 BPM | WEIGHT: 203 LBS | HEIGHT: 69 IN | DIASTOLIC BLOOD PRESSURE: 62 MMHG | BODY MASS INDEX: 30.07 KG/M2 | TEMPERATURE: 98.3 F | SYSTOLIC BLOOD PRESSURE: 86 MMHG

## 2022-08-31 DIAGNOSIS — Z12.11 ENCOUNTER FOR SCREENING FOR MALIGNANT NEOPLASM OF COLON: ICD-10-CM

## 2022-08-31 DIAGNOSIS — K76.89 HEPATIC CYST: ICD-10-CM

## 2022-08-31 DIAGNOSIS — Z12.12 ENCOUNTER FOR SCREENING FOR MALIGNANT NEOPLASM OF RECTUM: ICD-10-CM

## 2022-08-31 DIAGNOSIS — K58.1 IRRITABLE BOWEL SYNDROME WITH CONSTIPATION: ICD-10-CM

## 2022-08-31 DIAGNOSIS — R10.11 RUQ PAIN: ICD-10-CM

## 2022-08-31 PROBLEM — 305058001: Status: ACTIVE | Noted: 2022-08-31

## 2022-08-31 PROBLEM — 85057007: Status: ACTIVE | Noted: 2022-08-31

## 2022-08-31 PROBLEM — 440630006: Status: ACTIVE | Noted: 2022-08-31

## 2022-08-31 PROCEDURE — 99204 OFFICE O/P NEW MOD 45 MIN: CPT | Performed by: INTERNAL MEDICINE

## 2022-08-31 RX ORDER — ALPRAZOLAM 0.5 MG/1
1 TABLET TABLET ORAL TWICE A DAY
Status: ACTIVE | COMMUNITY

## 2022-08-31 RX ORDER — LINACLOTIDE 72 UG/1
1 CAPSULE AT LEAST 30 MINUTES BEFORE THE FIRST MEAL OF THE DAY ON AN EMPTY STOMACH CAPSULE, GELATIN COATED ORAL ONCE A DAY
Status: ACTIVE | COMMUNITY

## 2022-08-31 RX ORDER — HYDROCHLOROTHIAZIDE 12.5 MG/1
1 TABLET IN THE MORNING TABLET ORAL ONCE A DAY
Status: ACTIVE | COMMUNITY

## 2022-08-31 RX ORDER — ESTRADIOL 1 MG/1
1 TABLET TABLET ORAL ONCE A DAY
Status: ACTIVE | COMMUNITY

## 2022-08-31 RX ORDER — VENLAFAXINE HYDROCHLORIDE 150 MG/1
1 CAPSULE WITH FOOD CAPSULE, EXTENDED RELEASE ORAL ONCE A DAY
Status: ACTIVE | COMMUNITY

## 2022-08-31 RX ORDER — ATORVASTATIN CALCIUM 40 MG/1
1 TABLET TABLET, FILM COATED ORAL ONCE A DAY
Status: ACTIVE | COMMUNITY

## 2022-08-31 RX ORDER — OMEPRAZOLE 40 MG/1
1 CAPSULE 30 MINUTES BEFORE MORNING MEAL CAPSULE, DELAYED RELEASE ORAL ONCE A DAY
Status: ACTIVE | COMMUNITY

## 2022-08-31 RX ORDER — OLMESARTAN MEDOXOMIL 5 MG/1
1 TABLET TABLET, FILM COATED ORAL ONCE A DAY
Status: ACTIVE | COMMUNITY

## 2022-08-31 RX ORDER — LINACLOTIDE 72 UG/1
1 CAPSULE AT LEAST 30 MINUTES BEFORE THE FIRST MEAL OF THE DAY ON AN EMPTY STOMACH CAPSULE, GELATIN COATED ORAL ONCE A DAY
OUTPATIENT

## 2022-08-31 RX ORDER — ALPRAZOLAM 2 MG/1
1 TABLET TABLET ORAL TWICE A DAY
Status: ON HOLD | COMMUNITY

## 2022-08-31 NOTE — HPI-TODAY'S VISIT:
Ms Beauchamp is a very pleasant 62 year old  female here with coloretal cancern screening evaluation. Also reports to have existing diagnosis of IBS-C and hepatic cysts and RUQ pain. Pt reports having long standing constipation and was diagnosed with IBS-C by primary care and started on Linezz 72 mcg folowed by higher dose of linzess which gave her diarrhea. Currently Ms beauchamp is on 72 mcg of Linzess, with as needed metamucil and miralax and symptoms are well controlled.  Reports intermittent rectal bleeding and attributes it to constipation. As far as the patient can recall she intermittently has RUQ pain, and an US RUQ showed hepatic cysts.  No active complaints on todays visit. Denies having any nausea, vomiting abdominal pain, melena, hematochezia, weight loss and lack of appetite. Maternal grand father has hx of colon cancer.

## 2022-09-01 PROBLEM — 305058001: Status: ACTIVE | Noted: 2022-08-31

## 2022-09-19 ENCOUNTER — APPOINTMENT (RX ONLY)
Dept: URBAN - METROPOLITAN AREA CLINIC 33 | Facility: CLINIC | Age: 62
Setting detail: DERMATOLOGY
End: 2022-09-19

## 2022-09-19 DIAGNOSIS — Z41.9 ENCOUNTER FOR PROCEDURE FOR PURPOSES OTHER THAN REMEDYING HEALTH STATE, UNSPECIFIED: ICD-10-CM

## 2022-09-19 PROCEDURE — ? INVENTORY

## 2022-09-19 PROCEDURE — ? DYSPORT (U OR CC)

## 2022-09-19 NOTE — HPI: COSMETIC (DYSPORT)
previous_has_had_dysport
Additional History: Patient states that area in the right and left glabella/eyelid were too low.

## 2022-09-19 NOTE — PROCEDURE: DYSPORT (U OR CC)
Additional Area 3 Units: 0
Lot #: [id number]
Post-Care Instructions: Patient instructed to not lie down for 4 hours and limit physical activity for 24 hours.
Glabellar Complex Units: 27
Forehead Units: 30
Detail Level: Detailed
Expiration Date (Month Year): 02/28/2023
Consent: Written consent obtained. Risks include but not limited to lid/brow ptosis, bruising, swelling, diplopia, temporary effect, incomplete chemical denervation.

## 2022-10-06 ENCOUNTER — OFFICE VISIT (OUTPATIENT)
Dept: URBAN - METROPOLITAN AREA SURGERY CENTER 24 | Facility: SURGERY CENTER | Age: 62
End: 2022-10-06
Payer: COMMERCIAL

## 2022-10-06 DIAGNOSIS — Z12.11 COLON CANCER SCREENING: ICD-10-CM

## 2022-10-06 DIAGNOSIS — K63.89 BACTERIAL OVERGROWTH SYNDROME: ICD-10-CM

## 2022-10-06 PROCEDURE — 45385 COLONOSCOPY W/LESION REMOVAL: CPT | Performed by: INTERNAL MEDICINE

## 2022-10-06 PROCEDURE — G8907 PT DOC NO EVENTS ON DISCHARG: HCPCS | Performed by: INTERNAL MEDICINE

## 2022-10-06 RX ORDER — ALPRAZOLAM 0.5 MG/1
1 TABLET TABLET ORAL TWICE A DAY
Status: ACTIVE | COMMUNITY

## 2022-10-06 RX ORDER — HYDROCHLOROTHIAZIDE 12.5 MG/1
1 TABLET IN THE MORNING TABLET ORAL ONCE A DAY
Status: ACTIVE | COMMUNITY

## 2022-10-06 RX ORDER — OLMESARTAN MEDOXOMIL 5 MG/1
1 TABLET TABLET, FILM COATED ORAL ONCE A DAY
Status: ACTIVE | COMMUNITY

## 2022-10-06 RX ORDER — OMEPRAZOLE 40 MG/1
1 CAPSULE 30 MINUTES BEFORE MORNING MEAL CAPSULE, DELAYED RELEASE ORAL ONCE A DAY
Status: ACTIVE | COMMUNITY

## 2022-10-06 RX ORDER — LINACLOTIDE 72 UG/1
1 CAPSULE AT LEAST 30 MINUTES BEFORE THE FIRST MEAL OF THE DAY ON AN EMPTY STOMACH CAPSULE, GELATIN COATED ORAL ONCE A DAY
Status: ACTIVE | COMMUNITY

## 2022-10-06 RX ORDER — ESTRADIOL 1 MG/1
1 TABLET TABLET ORAL ONCE A DAY
Status: ACTIVE | COMMUNITY

## 2022-10-06 RX ORDER — ALPRAZOLAM 2 MG/1
1 TABLET TABLET ORAL TWICE A DAY
Status: ON HOLD | COMMUNITY

## 2022-10-06 RX ORDER — VENLAFAXINE HYDROCHLORIDE 150 MG/1
1 CAPSULE WITH FOOD CAPSULE, EXTENDED RELEASE ORAL ONCE A DAY
Status: ACTIVE | COMMUNITY

## 2022-10-06 RX ORDER — ATORVASTATIN CALCIUM 40 MG/1
1 TABLET TABLET, FILM COATED ORAL ONCE A DAY
Status: ACTIVE | COMMUNITY

## 2023-02-13 ENCOUNTER — APPOINTMENT (RX ONLY)
Dept: URBAN - METROPOLITAN AREA CLINIC 33 | Facility: CLINIC | Age: 63
Setting detail: DERMATOLOGY
End: 2023-02-13

## 2023-02-13 DIAGNOSIS — Z41.9 ENCOUNTER FOR PROCEDURE FOR PURPOSES OTHER THAN REMEDYING HEALTH STATE, UNSPECIFIED: ICD-10-CM

## 2023-02-13 PROCEDURE — ? TREATMENT REGIMEN

## 2023-02-13 PROCEDURE — ? DYSPORT (U OR CC)

## 2023-02-13 PROCEDURE — ? INVENTORY

## 2023-02-13 NOTE — PROCEDURE: DYSPORT (U OR CC)
Additional Area 3 Units: 0
Lot #: [id number]
Post-Care Instructions: Patient instructed to not lie down for 4 hours and limit physical activity for 24 hours.
Glabellar Complex Units: 27
Forehead Units: 30
Detail Level: Detailed
Expiration Date (Month Year): 02/28/2023
Consent: Written consent obtained. Risks include but not limited to lid/brow ptosis, bruising, swelling, diplopia, temporary effect, incomplete chemical denervation.

## 2023-07-24 ENCOUNTER — APPOINTMENT (RX ONLY)
Dept: URBAN - METROPOLITAN AREA CLINIC 46 | Facility: CLINIC | Age: 63
Setting detail: DERMATOLOGY
End: 2023-07-24

## 2023-07-24 DIAGNOSIS — Z41.9 ENCOUNTER FOR PROCEDURE FOR PURPOSES OTHER THAN REMEDYING HEALTH STATE, UNSPECIFIED: ICD-10-CM

## 2023-07-24 DIAGNOSIS — L91.0 HYPERTROPHIC SCAR: ICD-10-CM

## 2023-07-24 DIAGNOSIS — L57.0 ACTINIC KERATOSIS: ICD-10-CM

## 2023-07-24 DIAGNOSIS — L82.1 OTHER SEBORRHEIC KERATOSIS: ICD-10-CM

## 2023-07-24 DIAGNOSIS — L85.8 OTHER SPECIFIED EPIDERMAL THICKENING: ICD-10-CM

## 2023-07-24 PROCEDURE — 99213 OFFICE O/P EST LOW 20 MIN: CPT

## 2023-07-24 PROCEDURE — ? ORDER FOR PHOTODYNAMIC THERAPY

## 2023-07-24 PROCEDURE — ? DYSPORT (U OR CC)

## 2023-07-24 PROCEDURE — ? INVENTORY

## 2023-07-24 PROCEDURE — ? COUNSELING

## 2023-07-24 PROCEDURE — ? TREATMENT REGIMEN

## 2023-07-24 ASSESSMENT — LOCATION DETAILED DESCRIPTION DERM
LOCATION DETAILED: RIGHT MEDIAL PLANTAR HEEL
LOCATION DETAILED: LOWER STERNUM
LOCATION DETAILED: LEFT MEDIAL SUPERIOR CHEST
LOCATION DETAILED: LEFT CRUS OF HELIX
LOCATION DETAILED: LEFT LATERAL PLANTAR HEEL

## 2023-07-24 ASSESSMENT — LOCATION ZONE DERM
LOCATION ZONE: EAR
LOCATION ZONE: FEET
LOCATION ZONE: TRUNK

## 2023-07-24 ASSESSMENT — LOCATION SIMPLE DESCRIPTION DERM
LOCATION SIMPLE: LEFT EAR
LOCATION SIMPLE: CHEST
LOCATION SIMPLE: RIGHT PLANTAR SURFACE
LOCATION SIMPLE: LEFT PLANTAR SURFACE

## 2023-07-24 NOTE — PROCEDURE: ORDER FOR PHOTODYNAMIC THERAPY
Pdt Type: Red Light
Face And Ears Incubation Time: 1 Hour
Detail Level: Simple
Location: Chest
Arms And Hands Incubation Time: 2 Hours
Consent: The procedure and risks were reviewed with the patient including but not limited to: burning, pigmentary changes, pain, blistering, scabbing, redness, and the possibility of needing numerous treatments. Strict photoprotection after the procedure was also discussed.
Face Incubation Time: 120 minutes
Photosensitizer: Ameluz
Face And Scalp Incubation Time: 1 Hour for the face and 2 Hours for the scalp
Debridement: No
Chest Incubation Time: 90 minutes
Frequency Of Pdt: Three treatments

## 2023-07-24 NOTE — HPI: EVALUATION OF SKIN LESION(S)
What Type Of Note Output Would You Prefer (Optional)?: Bullet Format
Hpi Title: Evaluation of Skin Lesions
How Severe Are Your Spot(S)?: mild
Have Your Spot(S) Been Treated In The Past?: has not been treated
Additional History: Patient is here for evaluation of concerning lesion near left ear itches and is tender, patient states she had a lower jaw lift years ago where she was cut near the ears and the lesion has started to enlarge for months now. Patient is also concerned of a patch on her sternum that occasionally starts to look like a pimple but is unable to pop it. Patient suspects she has a lipoma on the left lower abdomen, would like to discuss surgical excision.

## 2023-07-24 NOTE — PROCEDURE: TREATMENT REGIMEN
Detail Level: Zone
Plan: Will inject 27 units into forehead, 30 units in periorbital, 30 units in glabella today.
Plan: Discussed option of injecting lesion with Kenalog to try to soften scar tissue, would inject every 4-6 weeks. The scar is not expected to become a keloid. \\n\\nWill schedule a separate appt to inject.
Plan: Discussed option of treating with LN2 today but is not necessary to treat. Area seems a bit rash today but patient is concerned about one specific lesion on the chest. Patient has not noticed rash previously, will recheck area at next visit.
Otc Regimen: CeraVe SA cream apply to thickened skin on feet daily

## 2023-07-24 NOTE — PROCEDURE: DYSPORT (U OR CC)
Additional Area 3 Units: 0
Lot #: [id number]
Post-Care Instructions: Patient instructed to not lie down for 4 hours and limit physical activity for 24 hours.
Glabellar Complex Units: 30
Forehead Units: 27
Detail Level: Detailed
Expiration Date (Month Year): 01/31/2025
Consent: Written consent obtained. Risks include but not limited to lid/brow ptosis, bruising, swelling, diplopia, temporary effect, incomplete chemical denervation.

## 2023-08-02 ENCOUNTER — APPOINTMENT (RX ONLY)
Dept: URBAN - METROPOLITAN AREA CLINIC 46 | Facility: CLINIC | Age: 63
Setting detail: DERMATOLOGY
End: 2023-08-02

## 2023-08-02 DIAGNOSIS — L91.0 HYPERTROPHIC SCAR: ICD-10-CM

## 2023-08-02 PROCEDURE — ? INTRALESIONAL KENALOG

## 2023-08-02 PROCEDURE — 11900 INJECT SKIN LESIONS </W 7: CPT

## 2023-08-02 PROCEDURE — ? PHOTO-DOCUMENTATION

## 2023-08-02 PROCEDURE — ? TREATMENT REGIMEN

## 2023-08-02 PROCEDURE — ? COUNSELING

## 2023-08-02 ASSESSMENT — LOCATION SIMPLE DESCRIPTION DERM
LOCATION SIMPLE: LEFT TEMPLE
LOCATION SIMPLE: LEFT EAR
LOCATION SIMPLE: RIGHT EAR

## 2023-08-02 ASSESSMENT — LOCATION DETAILED DESCRIPTION DERM
LOCATION DETAILED: RIGHT POSTAURICULAR CREASE
LOCATION DETAILED: LEFT CRUS OF HELIX
LOCATION DETAILED: LEFT LATERAL TEMPLE

## 2023-08-02 ASSESSMENT — LOCATION ZONE DERM
LOCATION ZONE: EAR
LOCATION ZONE: FACE

## 2023-08-02 NOTE — PROCEDURE: INTRALESIONAL KENALOG
How Many Mls Were Removed From The 10 Mg/Ml (5ml) Vial When Preparing The Injectable Solution?: 0
Lot # For Kenalog (Optional): [id number]
Treatment Number (Optional): 1
Administered By (Optional): Cintia Chaudhary MD
Detail Level: Detailed
Expiration Date For Kenalog (Optional): 06/2024
Include Z78.9 (Other Specified Conditions Influencing Health Status) As An Associated Diagnosis?: No
Validate Note Data When Using Inventory: Yes
Concentration Of Kenalog Solution Injected (Mg/Ml): 20.0
Medical Necessity Clause: This procedure was medically necessary because the lesions that were treated were:
Ndc# For Kenalog Only: 2737-0014-91
Show Inventory Tab: Hide
Consent: The risks of atrophy were reviewed with the patient.
Total Volume (Ccs): 1.5
Kenalog Type Of Vial: Multiple Dose
Kenalog Preparation: Kenalog
given to family

## 2023-08-02 NOTE — PROCEDURE: TREATMENT REGIMEN
Plan: Hypertrophic scars from a neck lift years ago, patient has been complaining of pain  and irritation of the scar located on left ear due to sleeping on that side. \\n\\nWill plan to inject every 4-6 weeks, will check in 4 weeks and possibly inject at next visit if needed. Patient does plan on moving to [place] in early September but is unsure about switching practices at this time. \\n\\nPatient recommended to try derma-rolling once weekly to the scars near the ears once weekly with a 1.0 mm length. Is a rolling wheel with small pins, roll the area in 3 different directions. \\n\\nWill give patient information about Dr. Rodrigo Raines for him to evaluate cosmetic concerns.
Detail Level: Zone

## 2023-08-16 NOTE — PROCEDURE: DYSPORT
Show Inventory Tab: Hide
Additional Area 4 Units: 0
Show Mentalis Units: No
Periorbital Skin Units: 30
Show Nasal Units: Yes
Expiration Date (Month Year): 10/31/2022
Glabellar Complex Units: 27
Post-Care Instructions: Patient instructed to not lie down for 4 hours and limit physical activity for 24 hours.
Detail Level: Detailed
Consent: Written consent obtained. Risks include but not limited to lid/brow ptosis, bruising, swelling, diplopia, temporary effect, incomplete chemical denervation.
Lot #: [id number]
Calcipotriene Counseling:  I discussed with the patient the risks of calcipotriene including but not limited to erythema, scaling, itching, and irritation.

## 2023-08-31 ENCOUNTER — APPOINTMENT (RX ONLY)
Dept: URBAN - METROPOLITAN AREA CLINIC 46 | Facility: CLINIC | Age: 63
Setting detail: DERMATOLOGY
End: 2023-08-31

## 2023-08-31 DIAGNOSIS — L91.0 HYPERTROPHIC SCAR: ICD-10-CM

## 2023-08-31 PROCEDURE — ? INTRALESIONAL KENALOG

## 2023-08-31 PROCEDURE — ? TREATMENT REGIMEN

## 2023-08-31 PROCEDURE — 11900 INJECT SKIN LESIONS </W 7: CPT

## 2023-08-31 PROCEDURE — ? COUNSELING

## 2023-08-31 ASSESSMENT — LOCATION ZONE DERM
LOCATION ZONE: FACE
LOCATION ZONE: EAR

## 2023-08-31 ASSESSMENT — LOCATION SIMPLE DESCRIPTION DERM
LOCATION SIMPLE: LEFT TEMPLE
LOCATION SIMPLE: LEFT EAR
LOCATION SIMPLE: RIGHT EAR

## 2023-08-31 NOTE — PROCEDURE: TREATMENT REGIMEN
Plan: Scars feel softer and decreased in size today. \\n\\nIf patient intends on derma-rolling scars and the entire face, advised to sanitize the roller well in-between treatments and apply a hyaluronic serum to areas that were treated and use aquaphor. \\n\\nWill inject scars with Kenalog today.
Detail Level: Zone

## 2023-08-31 NOTE — HPI: SCAR (HYPERTROPHIC SCAR)
How Severe Is It?: mild
Is This A New Presentation, Or A Follow-Up?: Follow Up Hypertrophic Scar
Additional History: Patient is here for a f/u on her hypertrophic scars on the face. Patient last had injections to scars in August 2nd and reports the scars have since decreased in size.

## 2023-12-05 ENCOUNTER — APPOINTMENT (RX ONLY)
Dept: URBAN - METROPOLITAN AREA CLINIC 46 | Facility: CLINIC | Age: 63
Setting detail: DERMATOLOGY
End: 2023-12-05

## 2023-12-05 DIAGNOSIS — L91.0 HYPERTROPHIC SCAR: ICD-10-CM

## 2023-12-05 DIAGNOSIS — Z41.9 ENCOUNTER FOR PROCEDURE FOR PURPOSES OTHER THAN REMEDYING HEALTH STATE, UNSPECIFIED: ICD-10-CM

## 2023-12-05 PROCEDURE — ? INVENTORY

## 2023-12-05 PROCEDURE — ? ADDITIONAL NOTES

## 2023-12-05 PROCEDURE — ? COUNSELING

## 2023-12-05 PROCEDURE — ? DYSPORT (U OR CC)

## 2023-12-05 ASSESSMENT — LOCATION DETAILED DESCRIPTION DERM: LOCATION DETAILED: LEFT CRUS OF HELIX

## 2023-12-05 ASSESSMENT — LOCATION SIMPLE DESCRIPTION DERM: LOCATION SIMPLE: LEFT EAR

## 2023-12-05 ASSESSMENT — LOCATION ZONE DERM: LOCATION ZONE: EAR

## 2023-12-05 NOTE — PROCEDURE: DYSPORT (U OR CC)
Periorbital Skin Units: 30
Additional Area 3 Units: 0
Detail Level: Detailed
Forehead Units: 27
Consent: Written consent obtained. Risks include but not limited to lid/brow ptosis, bruising, swelling, diplopia, temporary effect, incomplete chemical denervation.
Post-Care Instructions: Patient instructed to not lie down for 4 hours and limit physical activity for 24 hours.

## 2023-12-05 NOTE — PROCEDURE: ADDITIONAL NOTES
Detail Level: Detailed
Render Risk Assessment In Note?: no
Additional Notes: We discussed that while it is a possibility the scar will become enlarged again, it is unlikely.\\Lester this time she does not need additional treatment.

## 2023-12-05 NOTE — HPI: COSMETIC (DYSPORT)
previous_has_had_dysport
Additional History: Pt is here for dysport, last visit she recieved 87 units to the forehead, glabella, and periorbital region. Pt is happy with previous results and would like to recieve the same amount.

## 2024-05-06 ENCOUNTER — APPOINTMENT (RX ONLY)
Dept: URBAN - METROPOLITAN AREA CLINIC 46 | Facility: CLINIC | Age: 64
Setting detail: DERMATOLOGY
End: 2024-05-06

## 2024-05-06 DIAGNOSIS — Z41.9 ENCOUNTER FOR PROCEDURE FOR PURPOSES OTHER THAN REMEDYING HEALTH STATE, UNSPECIFIED: ICD-10-CM

## 2024-05-06 PROCEDURE — ? ADDITIONAL NOTES

## 2024-05-06 PROCEDURE — ? DYSPORT (U OR CC)

## 2024-05-06 PROCEDURE — ? INVENTORY

## 2024-05-06 NOTE — PROCEDURE: DYSPORT (U OR CC)
Periorbital Skin Units: 30
Additional Area 6 Units: 0
Consent: Written consent obtained. Risks include but not limited to lid/brow ptosis, bruising, swelling, diplopia, temporary effect, incomplete chemical denervation.
Detail Level: Detailed
Expiration Date (Month Year): 06/30/2025
Post-Care Instructions: Patient instructed to not lie down for 4 hours and limit physical activity for 24 hours.
Forehead Units: 27
Lot #: [id number]

## 2024-05-06 NOTE — PROCEDURE: ADDITIONAL NOTES
Render Risk Assessment In Note?: no
Detail Level: Generalized
Additional Notes: For hair thinning at the front of the scalp. \\nWe discussed topical minoxidil, or she if she prefers we can prescribe an oral minoxidil.

## 2024-10-01 ENCOUNTER — APPOINTMENT (RX ONLY)
Dept: URBAN - METROPOLITAN AREA CLINIC 46 | Facility: CLINIC | Age: 64
Setting detail: DERMATOLOGY
End: 2024-10-01

## 2024-10-01 DIAGNOSIS — Z41.9 ENCOUNTER FOR PROCEDURE FOR PURPOSES OTHER THAN REMEDYING HEALTH STATE, UNSPECIFIED: ICD-10-CM

## 2024-10-01 PROCEDURE — ? INVENTORY

## 2024-10-01 PROCEDURE — ? DYSPORT (U OR CC)

## 2024-10-01 NOTE — PROCEDURE: DYSPORT (U OR CC)
Periorbital Skin Units: 30
Additional Area 6 Units: 0
Consent: Written consent obtained. Risks include but not limited to lid/brow ptosis, bruising, swelling, diplopia, temporary effect, incomplete chemical denervation.
Additional Comments: NDC: [id number]
Detail Level: Detailed
Expiration Date (Month Year): 04/30/2026
Post-Care Instructions: Patient instructed to not lie down for 4 hours and limit physical activity for 24 hours.
Forehead Units: 27
Lot #: [id number]

## 2024-10-01 NOTE — HPI: COSMETIC (DYSPORT)
previous_has_had_dysport
Additional History: Patient is here for dysport, she would like the same unit as last visit which were (Forehead - 27 units\\nPeriorbital Skin - 30 units and Glabellar Complex - 30 units).

## 2024-12-24 NOTE — PROCEDURE: INTRALESIONAL KENALOG
How Many Mls Were Removed From The 10 Mg/Ml (5ml) Vial When Preparing The Injectable Solution?: 0
Lot # For Kenalog (Optional): [id number]
Treatment Number (Optional): 2
Administered By (Optional): Cintia Chaudhary MD
Detail Level: Detailed
Expiration Date For Kenalog (Optional): 06/2024
Include Z78.9 (Other Specified Conditions Influencing Health Status) As An Associated Diagnosis?: No
Validate Note Data When Using Inventory: Yes
Concentration Of Kenalog Solution Injected (Mg/Ml): 20.0
Medical Necessity Clause: This procedure was medically necessary because the lesions that were treated were:
Ndc# For Kenalog Only: 3079-2391-07
Show Inventory Tab: Hide
Consent: The risks of atrophy were reviewed with the patient.
Total Volume (Ccs): 1
Kenalog Type Of Vial: Multiple Dose
Kenalog Preparation: Kenalog
No

## 2025-03-04 ENCOUNTER — APPOINTMENT (OUTPATIENT)
Dept: URBAN - METROPOLITAN AREA CLINIC 46 | Facility: CLINIC | Age: 65
Setting detail: DERMATOLOGY
End: 2025-03-04

## 2025-03-04 DIAGNOSIS — Z41.9 ENCOUNTER FOR PROCEDURE FOR PURPOSES OTHER THAN REMEDYING HEALTH STATE, UNSPECIFIED: ICD-10-CM

## 2025-03-04 DIAGNOSIS — L72.0 EPIDERMAL CYST: ICD-10-CM

## 2025-03-04 PROCEDURE — ? DYSPORT (U OR CC)

## 2025-03-04 PROCEDURE — ? COUNSELING

## 2025-03-04 PROCEDURE — ? INVENTORY

## 2025-03-04 PROCEDURE — ? ADDITIONAL NOTES

## 2025-03-04 PROCEDURE — 99212 OFFICE O/P EST SF 10 MIN: CPT

## 2025-03-04 ASSESSMENT — LOCATION DETAILED DESCRIPTION DERM
LOCATION DETAILED: LEFT CENTRAL MALAR CHEEK
LOCATION DETAILED: RIGHT CENTRAL MALAR CHEEK

## 2025-03-04 ASSESSMENT — LOCATION SIMPLE DESCRIPTION DERM
LOCATION SIMPLE: LEFT CHEEK
LOCATION SIMPLE: RIGHT CHEEK

## 2025-03-04 ASSESSMENT — LOCATION ZONE DERM: LOCATION ZONE: FACE

## 2025-03-04 NOTE — PROCEDURE: ADDITIONAL NOTES
Additional Notes: Will do regular tx today, will adjust at pts next appt in July.
Detail Level: Simple
Render Risk Assessment In Note?: no
Additional Notes: Pt advised to continue using Tretinoin (prescribed by her other dermatologist) as it can help push milia to the surface and exfoliate the surface.

## 2025-03-04 NOTE — PROCEDURE: DYSPORT (U OR CC)
Periorbital Skin Units: 30
Additional Area 6 Units: 0
Consent: Written consent obtained. Risks include but not limited to lid/brow ptosis, bruising, swelling, diplopia, temporary effect, incomplete chemical denervation.
Additional Comments: 50 units taken from 636947-1 and 37 units taken from [id number]
Detail Level: Detailed
Expiration Date (Month Year): 07/31/2026
Post-Care Instructions: Patient instructed to not lie down for 4 hours and limit physical activity for 24 hours.
Forehead Units: 27
Lot #: [id number]